# Patient Record
Sex: FEMALE | Race: OTHER | Employment: PART TIME | URBAN - NONMETROPOLITAN AREA
[De-identification: names, ages, dates, MRNs, and addresses within clinical notes are randomized per-mention and may not be internally consistent; named-entity substitution may affect disease eponyms.]

---

## 2017-09-19 ENCOUNTER — APPOINTMENT (OUTPATIENT)
Dept: ULTRASOUND IMAGING | Age: 22
End: 2017-09-19
Payer: COMMERCIAL

## 2017-09-19 ENCOUNTER — HOSPITAL ENCOUNTER (EMERGENCY)
Age: 22
Discharge: HOME | End: 2017-09-19
Attending: EMERGENCY MEDICINE
Payer: COMMERCIAL

## 2017-09-19 VITALS
HEART RATE: 85 BPM | DIASTOLIC BLOOD PRESSURE: 82 MMHG | OXYGEN SATURATION: 98 % | SYSTOLIC BLOOD PRESSURE: 139 MMHG | RESPIRATION RATE: 18 BRPM | TEMPERATURE: 97.5 F

## 2017-09-19 DIAGNOSIS — K80.20 CALCULUS OF GALLBLADDER WITHOUT CHOLECYSTITIS WITHOUT OBSTRUCTION: Primary | ICD-10-CM

## 2017-09-19 LAB
ALBUMIN SERPL-MCNC: 4.3 G/DL (ref 3.5–5.1)
ALP BLD-CCNC: 65 U/L (ref 38–126)
ALT SERPL-CCNC: 55 U/L (ref 11–66)
AMPHETAMINE+METHAMPHETAMINE URINE SCREEN: NEGATIVE
ANION GAP SERPL CALCULATED.3IONS-SCNC: 13 MEQ/L (ref 8–16)
AST SERPL-CCNC: 75 U/L (ref 5–40)
BACTERIA: ABNORMAL /HPF
BARBITURATE QUANTITATIVE URINE: NEGATIVE
BASOPHILS # BLD: 0.3 %
BASOPHILS ABSOLUTE: 0 THOU/MM3 (ref 0–0.1)
BENZODIAZEPINE QUANTITATIVE URINE: NEGATIVE
BILIRUB SERPL-MCNC: 0.5 MG/DL (ref 0.3–1.2)
BILIRUBIN DIRECT: < 0.2 MG/DL (ref 0–0.3)
BILIRUBIN URINE: NEGATIVE
BLOOD, URINE: NEGATIVE
BUN BLDV-MCNC: 10 MG/DL (ref 7–22)
CALCIUM SERPL-MCNC: 9.5 MG/DL (ref 8.5–10.5)
CANNABINOID QUANTITATIVE URINE: NEGATIVE
CASTS 2: ABNORMAL /LPF
CASTS UA: ABNORMAL /LPF
CHARACTER, URINE: ABNORMAL
CHLORIDE BLD-SCNC: 97 MEQ/L (ref 98–111)
CO2: 28 MEQ/L (ref 23–33)
COCAINE METABOLITE QUANTITATIVE URINE: NEGATIVE
COLOR: YELLOW
CREAT SERPL-MCNC: 0.8 MG/DL (ref 0.4–1.2)
CRYSTALS, UA: ABNORMAL
EKG ATRIAL RATE: 81 BPM
EKG P AXIS: 33 DEGREES
EKG P-R INTERVAL: 136 MS
EKG Q-T INTERVAL: 380 MS
EKG QRS DURATION: 84 MS
EKG QTC CALCULATION (BAZETT): 441 MS
EKG R AXIS: 44 DEGREES
EKG T AXIS: 26 DEGREES
EKG VENTRICULAR RATE: 81 BPM
EOSINOPHIL # BLD: 2.3 %
EOSINOPHILS ABSOLUTE: 0.4 THOU/MM3 (ref 0–0.4)
EPITHELIAL CELLS, UA: ABNORMAL /HPF
ETHYL ALCOHOL, SERUM: < 0.01 %
GFR SERPL CREATININE-BSD FRML MDRD: 89 ML/MIN/1.73M2
GLUCOSE BLD-MCNC: 111 MG/DL (ref 70–108)
GLUCOSE URINE: NEGATIVE MG/DL
HCT VFR BLD CALC: 40 % (ref 37–47)
HEMOGLOBIN: 13.7 GM/DL (ref 12–16)
KETONES, URINE: NEGATIVE
LEUKOCYTE ESTERASE, URINE: NEGATIVE
LIPASE: 57.3 U/L (ref 5.6–51.3)
LYMPHOCYTES # BLD: 19 %
LYMPHOCYTES ABSOLUTE: 3.1 THOU/MM3 (ref 1–4.8)
MAGNESIUM: 1.6 MG/DL (ref 1.6–2.4)
MCH RBC QN AUTO: 28.7 PG (ref 27–31)
MCHC RBC AUTO-ENTMCNC: 34.3 GM/DL (ref 33–37)
MCV RBC AUTO: 83.7 FL (ref 81–99)
MISCELLANEOUS 2: ABNORMAL
MONOCYTES # BLD: 4.7 %
MONOCYTES ABSOLUTE: 0.8 THOU/MM3 (ref 0.4–1.3)
NITRITE, URINE: NEGATIVE
NUCLEATED RED BLOOD CELLS: 0 /100 WBC
OPIATES, URINE: NEGATIVE
OSMOLALITY CALCULATION: 275.4 MOSMOL/KG (ref 275–300)
OXYCODONE: NEGATIVE
PDW BLD-RTO: 13.2 % (ref 11.5–14.5)
PH UA: 6
PHENCYCLIDINE QUANTITATIVE URINE: NEGATIVE
PLATELET # BLD: 256 THOU/MM3 (ref 130–400)
PMV BLD AUTO: 8.2 MCM (ref 7.4–10.4)
POTASSIUM SERPL-SCNC: 3.9 MEQ/L (ref 3.5–5.2)
PREGNANCY, SERUM: NEGATIVE
PROTEIN UA: NEGATIVE
RBC # BLD: 4.78 MILL/MM3 (ref 4.2–5.4)
RBC # BLD: NORMAL 10*6/UL
RBC URINE: ABNORMAL /HPF
RENAL EPITHELIAL, UA: ABNORMAL
SEG NEUTROPHILS: 73.7 %
SEGMENTED NEUTROPHILS ABSOLUTE COUNT: 11.9 THOU/MM3 (ref 1.8–7.7)
SODIUM BLD-SCNC: 138 MEQ/L (ref 135–145)
SPECIFIC GRAVITY, URINE: 1.01 (ref 1–1.03)
TOTAL PROTEIN: 7.4 G/DL (ref 6.1–8)
UROBILINOGEN, URINE: 0.2 EU/DL
WBC # BLD: 16.1 THOU/MM3 (ref 4.8–10.8)
WBC UA: ABNORMAL /HPF
YEAST: ABNORMAL

## 2017-09-19 PROCEDURE — 96375 TX/PRO/DX INJ NEW DRUG ADDON: CPT

## 2017-09-19 PROCEDURE — 6370000000 HC RX 637 (ALT 250 FOR IP): Performed by: EMERGENCY MEDICINE

## 2017-09-19 PROCEDURE — 2580000003 HC RX 258: Performed by: EMERGENCY MEDICINE

## 2017-09-19 PROCEDURE — 36415 COLL VENOUS BLD VENIPUNCTURE: CPT

## 2017-09-19 PROCEDURE — 99284 EMERGENCY DEPT VISIT MOD MDM: CPT

## 2017-09-19 PROCEDURE — 96374 THER/PROPH/DIAG INJ IV PUSH: CPT

## 2017-09-19 PROCEDURE — 83690 ASSAY OF LIPASE: CPT

## 2017-09-19 PROCEDURE — G0480 DRUG TEST DEF 1-7 CLASSES: HCPCS

## 2017-09-19 PROCEDURE — 85025 COMPLETE CBC W/AUTO DIFF WBC: CPT

## 2017-09-19 PROCEDURE — 82248 BILIRUBIN DIRECT: CPT

## 2017-09-19 PROCEDURE — 6360000002 HC RX W HCPCS: Performed by: EMERGENCY MEDICINE

## 2017-09-19 PROCEDURE — 84703 CHORIONIC GONADOTROPIN ASSAY: CPT

## 2017-09-19 PROCEDURE — C9113 INJ PANTOPRAZOLE SODIUM, VIA: HCPCS | Performed by: EMERGENCY MEDICINE

## 2017-09-19 PROCEDURE — 80053 COMPREHEN METABOLIC PANEL: CPT

## 2017-09-19 PROCEDURE — 93005 ELECTROCARDIOGRAM TRACING: CPT

## 2017-09-19 PROCEDURE — 81001 URINALYSIS AUTO W/SCOPE: CPT

## 2017-09-19 PROCEDURE — 83735 ASSAY OF MAGNESIUM: CPT

## 2017-09-19 PROCEDURE — 80307 DRUG TEST PRSMV CHEM ANLYZR: CPT

## 2017-09-19 PROCEDURE — 76705 ECHO EXAM OF ABDOMEN: CPT

## 2017-09-19 RX ORDER — 0.9 % SODIUM CHLORIDE 0.9 %
1000 INTRAVENOUS SOLUTION INTRAVENOUS ONCE
Status: COMPLETED | OUTPATIENT
Start: 2017-09-19 | End: 2017-09-19

## 2017-09-19 RX ORDER — PANTOPRAZOLE SODIUM 40 MG/10ML
40 INJECTION, POWDER, LYOPHILIZED, FOR SOLUTION INTRAVENOUS ONCE
Status: COMPLETED | OUTPATIENT
Start: 2017-09-19 | End: 2017-09-19

## 2017-09-19 RX ORDER — ONDANSETRON 2 MG/ML
4 INJECTION INTRAMUSCULAR; INTRAVENOUS ONCE
Status: COMPLETED | OUTPATIENT
Start: 2017-09-19 | End: 2017-09-19

## 2017-09-19 RX ORDER — HYDROCODONE BITARTRATE AND ACETAMINOPHEN 5; 325 MG/1; MG/1
1 TABLET ORAL EVERY 6 HOURS PRN
Qty: 15 TABLET | Refills: 0 | Status: SHIPPED | OUTPATIENT
Start: 2017-09-19 | End: 2017-09-21

## 2017-09-19 RX ORDER — ONDANSETRON 4 MG/1
4 TABLET, FILM COATED ORAL EVERY 8 HOURS PRN
Qty: 20 TABLET | Refills: 0 | Status: SHIPPED | OUTPATIENT
Start: 2017-09-19 | End: 2017-11-02

## 2017-09-19 RX ADMIN — Medication 30 ML: at 00:34

## 2017-09-19 RX ADMIN — PANTOPRAZOLE SODIUM 40 MG: 40 INJECTION, POWDER, FOR SOLUTION INTRAVENOUS at 00:34

## 2017-09-19 RX ADMIN — ONDANSETRON 4 MG: 2 INJECTION INTRAMUSCULAR; INTRAVENOUS at 00:34

## 2017-09-19 RX ADMIN — SODIUM CHLORIDE 1000 ML: 9 INJECTION, SOLUTION INTRAVENOUS at 00:34

## 2017-09-19 ASSESSMENT — ENCOUNTER SYMPTOMS
VOMITING: 1
DIARRHEA: 0
EYE DISCHARGE: 0
COUGH: 0
SORE THROAT: 0
EYE PAIN: 0
SHORTNESS OF BREATH: 0
WHEEZING: 0
ABDOMINAL PAIN: 1
BACK PAIN: 0
RHINORRHEA: 0
NAUSEA: 0

## 2017-09-19 ASSESSMENT — PAIN SCALES - GENERAL: PAINLEVEL_OUTOF10: 7

## 2017-09-19 ASSESSMENT — PAIN DESCRIPTION - PAIN TYPE: TYPE: ACUTE PAIN

## 2017-09-19 ASSESSMENT — PAIN DESCRIPTION - LOCATION: LOCATION: CHEST;ABDOMEN

## 2017-09-21 ENCOUNTER — OFFICE VISIT (OUTPATIENT)
Dept: SURGERY | Age: 22
End: 2017-09-21
Payer: COMMERCIAL

## 2017-09-21 VITALS
SYSTOLIC BLOOD PRESSURE: 122 MMHG | DIASTOLIC BLOOD PRESSURE: 76 MMHG | HEIGHT: 67 IN | RESPIRATION RATE: 18 BRPM | OXYGEN SATURATION: 97 % | HEART RATE: 114 BPM | BODY MASS INDEX: 41.59 KG/M2 | TEMPERATURE: 99.2 F | WEIGHT: 265 LBS

## 2017-09-21 DIAGNOSIS — K80.20 GALLSTONES: Primary | ICD-10-CM

## 2017-09-21 PROCEDURE — 99214 OFFICE O/P EST MOD 30 MIN: CPT | Performed by: SURGERY

## 2017-09-21 ASSESSMENT — ENCOUNTER SYMPTOMS
APNEA: 0
ANAL BLEEDING: 0
WHEEZING: 0
STRIDOR: 0
ABDOMINAL DISTENTION: 0
RECTAL PAIN: 0
NAUSEA: 1
EYE PAIN: 0
COLOR CHANGE: 0
PHOTOPHOBIA: 0
SORE THROAT: 0
EYE REDNESS: 0
FACIAL SWELLING: 0
ABDOMINAL PAIN: 1
CHEST TIGHTNESS: 0
SINUS PRESSURE: 0
CHOKING: 0
CONSTIPATION: 0
TROUBLE SWALLOWING: 0
VOICE CHANGE: 0
EYE ITCHING: 0
SHORTNESS OF BREATH: 1
BACK PAIN: 1
BLOOD IN STOOL: 0
COUGH: 0
VOMITING: 1
EYE DISCHARGE: 0
DIARRHEA: 0
RHINORRHEA: 0

## 2017-09-27 ENCOUNTER — OFFICE VISIT (OUTPATIENT)
Dept: CARDIOLOGY CLINIC | Age: 22
End: 2017-09-27
Payer: COMMERCIAL

## 2017-09-27 VITALS
WEIGHT: 259 LBS | SYSTOLIC BLOOD PRESSURE: 151 MMHG | HEIGHT: 67 IN | HEART RATE: 126 BPM | DIASTOLIC BLOOD PRESSURE: 105 MMHG | BODY MASS INDEX: 40.65 KG/M2

## 2017-09-27 DIAGNOSIS — R07.89 OTHER CHEST PAIN: Primary | ICD-10-CM

## 2017-09-27 PROCEDURE — 93000 ELECTROCARDIOGRAM COMPLETE: CPT | Performed by: INTERNAL MEDICINE

## 2017-09-27 PROCEDURE — 99204 OFFICE O/P NEW MOD 45 MIN: CPT | Performed by: INTERNAL MEDICINE

## 2017-09-27 NOTE — MR AVS SNAPSHOT
After Visit Summary             Marco Antonio Encarnacion   2017 9:45 AM   Office Visit    Description:  Female : 1995   Provider:  Zainab Hess MD   Department:  [facility] [place] [address] and Future Appointments         Below is a list of your follow-up and future appointments. This may not be a complete list as you may have made appointments directly with providers that we are not aware of or your providers may have made some for you. Please call your providers to confirm appointments. It is important to keep your appointments. Please bring your current insurance card, photo ID, co-pay, and all medication bottles to your appointment. If self-pay, payment is expected at the time of service. Your To-Do List     Follow-Up    Return if symptoms worsen or fail to improve. Information from Your Visit        Department     Name Address Phone Fax    [facility]  [place] [address] [phone]      You Were Seen for:         Comments    Other chest pain   [786.59. ICD-9-CM]         Vital Signs     Blood Pressure Pulse Height Weight Last Menstrual Period Body Mass Index    151/105 126 5' 7\" (1.702 m) 259 lb (117.5 kg) 2016 40.57 kg/m2    Smoking Status                   Never Smoker           Additional Information about your Body Mass Index (BMI)           Your BMI as listed above is considered obese (30 or more). BMI is an estimate of body fat, calculated from your height and weight. The higher your BMI, the greater your risk of heart disease, high blood pressure, type 2 diabetes, stroke, gallstones, arthritis, sleep apnea, and certain cancers. BMI is not perfect. It may overestimate body fat in athletes and people who are more muscular.   Even a small weight loss (between 5 and 10 percent of your current weight) by decreasing your calorie intake and becoming more physically active will help lower your risk of developing or worsening diseases associated with obesity. Learn more at: [employer]             Medications and Orders      Your Current Medications Are              ondansetron (ZOFRAN) 4 MG tablet Take 1 tablet by mouth every 8 hours as needed for Nausea or Vomiting    Ascorbic Acid (VITAMIN C) 500 MG tablet Take 500 mg by mouth daily      Allergies           No Known Allergies      We Ordered/Performed the following           EKG 12 Lead          Additional Information        Basic Information     Date Of Birth Sex Race Ethnicity Preferred Language    1995 Female  / English      Problem List as of 9/27/2017  Date Reviewed: 9/27/2017                Normal delivery      Preventive Care        Date Due    HIV screening is recommended for all people regardless of risk factors  aged 15-65 years at least once (lifetime) who have never been HIV tested. 4/5/2009    Tetanus Combination Vaccine (1 - Tdap) 4/5/2013    Pap Smear 4/5/2015    Yearly Flu Vaccine (1) 9/1/2017            [employer] Signup           Our records indicate that you have an active [employer] account. You can view your After Visit Summary by going to [email or web address] and logging in with your [employer] username and password. If you don't have a [employer] username and password but a parent or guardian has access to your record, the parent or guardian should login with their own [employer] username and password and access your record to view the After Visit Summary. Additional Information  If you have questions, please contact the physician practice where you receive care. Remember, [employer] is NOT to be used for urgent needs. For medical emergencies, dial 911. For questions regarding your [employer] account call [phone]. If you have a clinical question, please call your doctor's office.

## 2017-09-27 NOTE — PROGRESS NOTES
[facility] PROFESSIONAL SERVS  [facility] [place]  [address]  [address]  [place] [address]  Dept: [phone]  Dept Fax: [phone]  Loc: [phone]    Visit Date: 9/27/2017    Ms. Mabeline Koyanagi is a 20 y.o. female  who presented for:  Chief Complaint   Patient presents with    Establish Cardiologist    Cardiac Clearance       HPI:   HPI   21 yo F with hx of GERD, obesity who presents for cardiac evaluation prior to lap cholecystectomy. She was told she had an abnormal ECG. She has no cardiac symptoms. She has never had a CVA, CKD, CAD, CHF, or DM II. She has no active chest pain, sob, brooks, LE edema, palpitations, lightheadedness, dizziness, presyncope, or syncope. No active valvular issues. No A/C. No hx of DVT/PE. Able to climb a flight of stairs without issue or limitation. Cr 1.6. She was somewhat anxious regarding her ECG which was performed on 9/19 and did not demonstrate any Q-waves, but had reduced R-wave voltage in the anterior leads. Repeat ECG today demonstrated sinus tachycardia. No f/c/ns. Not actively menstruating. No unusual caffeine or drug intake. Current Outpatient Prescriptions:     ondansetron (ZOFRAN) 4 MG tablet, Take 1 tablet by mouth every 8 hours as needed for Nausea or Vomiting, Disp: 20 tablet, Rfl: 0    Ascorbic Acid (VITAMIN C) 500 MG tablet, Take 500 mg by mouth daily, Disp: , Rfl:     Past Medical History  Niranjan Fitzgerald  has a past medical history of Hypertension and Trauma. Social History  Niranjan Fitzgerald  reports that she has never smoked. She has never used smokeless tobacco. She reports that she does not drink alcohol or use illicit drugs. Family History  Suad family history is not on file. There is no family history of bicuspid aortic valve, aneurysms, heart transplant, pacemakers, defibrillators, or sudden cardiac death. Past Surgical History   History reviewed. No pertinent surgical history.     Subjective:     Review of Systems and oriented to person, place, and time. No cranial nerve deficit. Coordination normal.   Skin: Skin is warm and dry. Psychiatric: She has a normal mood and affect. Nursing note and vitals reviewed. No results found for: CKTOTAL, CKMB, CKMBINDEX    Lab Results   Component Value Date    WBC 16.1 09/19/2017    RBC 4.78 09/19/2017    HGB 13.7 09/19/2017    HCT 40.0 09/19/2017    MCV 83.7 09/19/2017    MCH 28.7 09/19/2017    MCHC 34.3 09/19/2017    RDW 13.2 09/19/2017     09/19/2017    MPV 8.2 09/19/2017       Lab Results   Component Value Date     09/19/2017    K 3.9 09/19/2017    CL 97 09/19/2017    CO2 28 09/19/2017    BUN 10 09/19/2017    LABALBU 4.3 09/19/2017    CREATININE 0.8 09/19/2017    CALCIUM 9.5 09/19/2017    LABGLOM 89 09/19/2017    GLUCOSE 111 09/19/2017       Lab Results   Component Value Date    ALKPHOS 65 09/19/2017    ALT 55 09/19/2017    AST 75 09/19/2017    PROT 7.4 09/19/2017    BILITOT 0.5 09/19/2017    BILIDIR <0.2 09/19/2017    LABALBU 4.3 09/19/2017       Lab Results   Component Value Date    MG 1.6 09/19/2017       No results found for: INR, PROTIME      No results found for: LABA1C    No results found for: TRIG, HDL, LDLCALC, LDLDIRECT, LABVLDL    No results found for: TSH      Testing Reviewed:      I have individually reviewed the below cardiac tests    EKG: As above    ECHO: No results found for this or any previous visit. STRESS: None    CATH: None    Assessment/Plan   #1 Preoperative CV exam for lap cholecystectomy  Low risk patient going for intermediate risk surgery. Can do > 4 METS. Discussed CV risk perioperatively and she agrees to move forward to with the elective surgery and accepts the < 1% risk of CV event perioperatively. She is anxious and her ECG demonstrates ST would recommend pre-op ECG as this is likely a physiologic response to her anxiety. All of the patient's/family's questions were answered to their satisfaction.   They were agreeable and amenable to the above plan. Thank you for allowing me to participate in the care of this patient. If I can be of any further assistance, please do not hesitate to contact my office ([phone]). Heriberto Garcia MD  Interventional Cardiology  [facility]      Disposition:  prn         Electronically signed by Francisco Hoffman MD   9/27/2017 at 10:16 AM

## 2017-09-28 ENCOUNTER — TELEPHONE (OUTPATIENT)
Dept: SURGERY | Age: 22
End: 2017-09-28

## 2017-10-01 ASSESSMENT — ENCOUNTER SYMPTOMS
ABDOMINAL DISTENTION: 0
NAUSEA: 0
COUGH: 0
EYE ITCHING: 0
SINUS PRESSURE: 0
CHEST TIGHTNESS: 0
CONSTIPATION: 0
DIARRHEA: 0
APNEA: 0
SHORTNESS OF BREATH: 0
SORE THROAT: 0
ABDOMINAL PAIN: 0
EYE PAIN: 0
BACK PAIN: 0
EYE REDNESS: 0
EYE DISCHARGE: 0
BLOOD IN STOOL: 0

## 2017-10-02 ENCOUNTER — TELEPHONE (OUTPATIENT)
Dept: SURGERY | Age: 22
End: 2017-10-02

## 2017-10-02 NOTE — TELEPHONE ENCOUNTER
Nando Alvarez called me back to schedule her lap dory & left a message last Friday 9/29 but I am off on Fridays so I just received her message today. I tried to call her back but no answer & again no message could be left.

## 2017-10-03 ENCOUNTER — TELEPHONE (OUTPATIENT)
Dept: SURGERY | Age: 22
End: 2017-10-03

## 2017-10-03 NOTE — TELEPHONE ENCOUNTER
Finally reached Enid Berger & she will speak to her  & call me back tomorrow morning regarding her surgery date.

## 2017-10-04 ENCOUNTER — TELEPHONE (OUTPATIENT)
Dept: SURGERY | Age: 22
End: 2017-10-04

## 2017-10-04 NOTE — TELEPHONE ENCOUNTER
Desiree Galan called & wanted her lap dory scheduled on Fri 10/13 at 11am & she will arrive at 9:30. Desiree Galan was told to be npo after midnight & consent was signed.

## 2017-10-06 NOTE — PROGRESS NOTES
In preparation for their surgical procedure above patient was screened for Obstructive Sleep Apnea (JANUARY) using the STOP-Bang Questionnaire by the Pre-Admission Testing department. This is a pre-surgical screening tool for patient safety and serves as a recommendation, this WILL NOT cause cancellation of surgery. STOP-Bang Questionnaire  * Do you currently see a pulmonologist?  No     If yes STOP, do not complete. Patient follows with     1. Do you snore loudly (able to be heard in the next room)? No    2. Do you often feel tired or sleepy during the daytime? No       3. Has anyone ever told you that you stop breathing during your sleep? No    4. Do you have or are you being treated for high blood pressure? No      5. BMI more than 35? BMI (Calculated): 31.4        No    6. Age over 48 years? 20 y.o. No    7. Neck Circumference greater than 17 inches for male or 16 inches for female? Measured           (visits only)            Not Applicable    8. Gender Male? No      TOTAL SCORE: 0    JANUARY - Low Risk : Yes to 0 - 2 questions  JANUARY - Intermediate Risk : Yes to 3 - 4 questions  JANUARY - High Risk : Yes to 5 - 8 questions    Adapted from:   STOP Questionnaire: A Tool to Screen Patients for Obstructive Sleep Apnea   INDER LoomisC[employer]P.C., Carina Chu M.B.B.S., Aman Miller M.D., Josette Manley. Willaim , Ph.D., LESLIE Gavin.B.B.S., Yahaira Arias, M.Sc., Dallas Campuzano M.D., Christiane Daily. CLIFF CamachoP.C.    Anesthesiology 2008; 423:989-33 Copyright 2008, the [address] of Anesthesiologists, [facility] 37.   ----------------------------------------------------------------------------------------------------------------

## 2017-10-06 NOTE — PROGRESS NOTES
NPO after midnight  [employer] and drivers license  Wear comfortable clean clothing  Do not bring jewelry   Shower night before and morning of surgery with a liquid antibacterial soap  Bring medications in original bottles  Follow all instructions given by your physician   needed at discharge

## 2017-10-13 ENCOUNTER — ANESTHESIA (OUTPATIENT)
Dept: OPERATING ROOM | Age: 22
End: 2017-10-13
Payer: COMMERCIAL

## 2017-10-13 ENCOUNTER — ANESTHESIA EVENT (OUTPATIENT)
Dept: OPERATING ROOM | Age: 22
End: 2017-10-13
Payer: COMMERCIAL

## 2017-10-13 ENCOUNTER — HOSPITAL ENCOUNTER (OUTPATIENT)
Age: 22
Setting detail: OUTPATIENT SURGERY
Discharge: HOME | End: 2017-10-13
Attending: SURGERY | Admitting: SURGERY
Payer: COMMERCIAL

## 2017-10-13 ENCOUNTER — APPOINTMENT (OUTPATIENT)
Dept: GENERAL RADIOLOGY | Age: 22
End: 2017-10-13
Attending: SURGERY
Payer: COMMERCIAL

## 2017-10-13 VITALS
TEMPERATURE: 97.7 F | OXYGEN SATURATION: 98 % | HEIGHT: 67 IN | SYSTOLIC BLOOD PRESSURE: 129 MMHG | RESPIRATION RATE: 16 BRPM | HEART RATE: 107 BPM | WEIGHT: 260.6 LBS | BODY MASS INDEX: 40.9 KG/M2 | DIASTOLIC BLOOD PRESSURE: 72 MMHG

## 2017-10-13 VITALS — DIASTOLIC BLOOD PRESSURE: 62 MMHG | SYSTOLIC BLOOD PRESSURE: 125 MMHG | OXYGEN SATURATION: 92 %

## 2017-10-13 LAB — PREGNANCY, URINE: NEGATIVE

## 2017-10-13 PROCEDURE — 6360000002 HC RX W HCPCS: Performed by: ANESTHESIOLOGY

## 2017-10-13 PROCEDURE — 2500000003 HC RX 250 WO HCPCS: Performed by: NURSE ANESTHETIST, CERTIFIED REGISTERED

## 2017-10-13 PROCEDURE — 81025 URINE PREGNANCY TEST: CPT

## 2017-10-13 PROCEDURE — 88304 TISSUE EXAM BY PATHOLOGIST: CPT

## 2017-10-13 PROCEDURE — 2580000003 HC RX 258

## 2017-10-13 PROCEDURE — 7100000010 HC PHASE II RECOVERY - FIRST 15 MIN: Performed by: SURGERY

## 2017-10-13 PROCEDURE — 7100000011 HC PHASE II RECOVERY - ADDTL 15 MIN: Performed by: SURGERY

## 2017-10-13 PROCEDURE — 47563 LAPARO CHOLECYSTECTOMY/GRAPH: CPT | Performed by: SURGERY

## 2017-10-13 PROCEDURE — 7100000000 HC PACU RECOVERY - FIRST 15 MIN: Performed by: SURGERY

## 2017-10-13 PROCEDURE — 6360000002 HC RX W HCPCS: Performed by: NURSE ANESTHETIST, CERTIFIED REGISTERED

## 2017-10-13 PROCEDURE — 3700000000 HC ANESTHESIA ATTENDED CARE: Performed by: SURGERY

## 2017-10-13 PROCEDURE — C1894 INTRO/SHEATH, NON-LASER: HCPCS | Performed by: SURGERY

## 2017-10-13 PROCEDURE — 6370000000 HC RX 637 (ALT 250 FOR IP): Performed by: SURGERY

## 2017-10-13 PROCEDURE — 3700000001 HC ADD 15 MINUTES (ANESTHESIA): Performed by: SURGERY

## 2017-10-13 PROCEDURE — 2500000003 HC RX 250 WO HCPCS: Performed by: SURGERY

## 2017-10-13 PROCEDURE — 3600000013 HC SURGERY LEVEL 3 ADDTL 15MIN: Performed by: SURGERY

## 2017-10-13 PROCEDURE — 6360000002 HC RX W HCPCS: Performed by: SURGERY

## 2017-10-13 PROCEDURE — C1729 CATH, DRAINAGE: HCPCS | Performed by: SURGERY

## 2017-10-13 PROCEDURE — 7100000001 HC PACU RECOVERY - ADDTL 15 MIN: Performed by: SURGERY

## 2017-10-13 PROCEDURE — 93005 ELECTROCARDIOGRAM TRACING: CPT

## 2017-10-13 PROCEDURE — 3600000003 HC SURGERY LEVEL 3 BASE: Performed by: SURGERY

## 2017-10-13 PROCEDURE — 74300 X-RAY BILE DUCTS/PANCREAS: CPT

## 2017-10-13 RX ORDER — FENTANYL CITRATE 50 UG/ML
50 INJECTION, SOLUTION INTRAMUSCULAR; INTRAVENOUS EVERY 5 MIN PRN
Status: DISCONTINUED | OUTPATIENT
Start: 2017-10-13 | End: 2017-10-13 | Stop reason: HOSPADM

## 2017-10-13 RX ORDER — DEXAMETHASONE SODIUM PHOSPHATE 4 MG/ML
INJECTION, SOLUTION INTRA-ARTICULAR; INTRALESIONAL; INTRAMUSCULAR; INTRAVENOUS; SOFT TISSUE PRN
Status: DISCONTINUED | OUTPATIENT
Start: 2017-10-13 | End: 2017-10-13 | Stop reason: SDUPTHER

## 2017-10-13 RX ORDER — MIDAZOLAM HYDROCHLORIDE 1 MG/ML
INJECTION INTRAMUSCULAR; INTRAVENOUS PRN
Status: DISCONTINUED | OUTPATIENT
Start: 2017-10-13 | End: 2017-10-13 | Stop reason: SDUPTHER

## 2017-10-13 RX ORDER — PROMETHAZINE HYDROCHLORIDE 25 MG/ML
INJECTION, SOLUTION INTRAMUSCULAR; INTRAVENOUS
Status: DISCONTINUED
Start: 2017-10-13 | End: 2017-10-13 | Stop reason: HOSPADM

## 2017-10-13 RX ORDER — FENTANYL CITRATE 50 UG/ML
INJECTION, SOLUTION INTRAMUSCULAR; INTRAVENOUS PRN
Status: DISCONTINUED | OUTPATIENT
Start: 2017-10-13 | End: 2017-10-13 | Stop reason: SDUPTHER

## 2017-10-13 RX ORDER — ROCURONIUM BROMIDE 10 MG/ML
INJECTION, SOLUTION INTRAVENOUS PRN
Status: DISCONTINUED | OUTPATIENT
Start: 2017-10-13 | End: 2017-10-13 | Stop reason: SDUPTHER

## 2017-10-13 RX ORDER — MEPERIDINE HYDROCHLORIDE 25 MG/ML
12.5 INJECTION INTRAMUSCULAR; INTRAVENOUS; SUBCUTANEOUS EVERY 5 MIN PRN
Status: DISCONTINUED | OUTPATIENT
Start: 2017-10-13 | End: 2017-10-13 | Stop reason: HOSPADM

## 2017-10-13 RX ORDER — BUPIVACAINE HYDROCHLORIDE AND EPINEPHRINE 5; 5 MG/ML; UG/ML
INJECTION, SOLUTION EPIDURAL; INTRACAUDAL; PERINEURAL PRN
Status: DISCONTINUED | OUTPATIENT
Start: 2017-10-13 | End: 2017-10-13 | Stop reason: HOSPADM

## 2017-10-13 RX ORDER — LIDOCAINE HYDROCHLORIDE 20 MG/ML
INJECTION, SOLUTION EPIDURAL; INFILTRATION; INTRACAUDAL; PERINEURAL PRN
Status: DISCONTINUED | OUTPATIENT
Start: 2017-10-13 | End: 2017-10-13 | Stop reason: SDUPTHER

## 2017-10-13 RX ORDER — PROPOFOL 10 MG/ML
INJECTION, EMULSION INTRAVENOUS PRN
Status: DISCONTINUED | OUTPATIENT
Start: 2017-10-13 | End: 2017-10-13 | Stop reason: SDUPTHER

## 2017-10-13 RX ORDER — SODIUM CHLORIDE 9 MG/ML
INJECTION, SOLUTION INTRAVENOUS CONTINUOUS
Status: DISCONTINUED | OUTPATIENT
Start: 2017-10-13 | End: 2017-10-13 | Stop reason: HOSPADM

## 2017-10-13 RX ORDER — OXYCODONE HYDROCHLORIDE AND ACETAMINOPHEN 5; 325 MG/1; MG/1
1 TABLET ORAL EVERY 6 HOURS PRN
Status: DISCONTINUED | OUTPATIENT
Start: 2017-10-13 | End: 2017-10-13 | Stop reason: HOSPADM

## 2017-10-13 RX ORDER — KETOROLAC TROMETHAMINE 30 MG/ML
INJECTION, SOLUTION INTRAMUSCULAR; INTRAVENOUS PRN
Status: DISCONTINUED | OUTPATIENT
Start: 2017-10-13 | End: 2017-10-13 | Stop reason: SDUPTHER

## 2017-10-13 RX ORDER — FENTANYL CITRATE 50 UG/ML
25 INJECTION, SOLUTION INTRAMUSCULAR; INTRAVENOUS EVERY 5 MIN PRN
Status: DISCONTINUED | OUTPATIENT
Start: 2017-10-13 | End: 2017-10-13 | Stop reason: HOSPADM

## 2017-10-13 RX ORDER — OXYCODONE HYDROCHLORIDE AND ACETAMINOPHEN 5; 325 MG/1; MG/1
1 TABLET ORAL EVERY 6 HOURS PRN
Qty: 20 TABLET | Refills: 0 | Status: SHIPPED | OUTPATIENT
Start: 2017-10-13 | End: 2017-11-02

## 2017-10-13 RX ORDER — METOCLOPRAMIDE HYDROCHLORIDE 5 MG/ML
10 INJECTION INTRAMUSCULAR; INTRAVENOUS
Status: COMPLETED | OUTPATIENT
Start: 2017-10-13 | End: 2017-10-13

## 2017-10-13 RX ORDER — DIPHENHYDRAMINE HYDROCHLORIDE 50 MG/ML
12.5 INJECTION INTRAMUSCULAR; INTRAVENOUS
Status: DISCONTINUED | OUTPATIENT
Start: 2017-10-13 | End: 2017-10-13 | Stop reason: HOSPADM

## 2017-10-13 RX ORDER — ONDANSETRON 2 MG/ML
INJECTION INTRAMUSCULAR; INTRAVENOUS
Status: DISCONTINUED
Start: 2017-10-13 | End: 2017-10-13 | Stop reason: HOSPADM

## 2017-10-13 RX ORDER — NEOSTIGMINE METHYLSULFATE 1 MG/ML
INJECTION, SOLUTION INTRAVENOUS PRN
Status: DISCONTINUED | OUTPATIENT
Start: 2017-10-13 | End: 2017-10-13 | Stop reason: SDUPTHER

## 2017-10-13 RX ORDER — GLYCOPYRROLATE 0.2 MG/ML
INJECTION INTRAMUSCULAR; INTRAVENOUS PRN
Status: DISCONTINUED | OUTPATIENT
Start: 2017-10-13 | End: 2017-10-13 | Stop reason: SDUPTHER

## 2017-10-13 RX ORDER — PROMETHAZINE HYDROCHLORIDE 25 MG/ML
12.5 INJECTION, SOLUTION INTRAMUSCULAR; INTRAVENOUS
Status: DISCONTINUED | OUTPATIENT
Start: 2017-10-13 | End: 2017-10-13 | Stop reason: HOSPADM

## 2017-10-13 RX ORDER — LABETALOL HYDROCHLORIDE 5 MG/ML
5 INJECTION, SOLUTION INTRAVENOUS EVERY 10 MIN PRN
Status: DISCONTINUED | OUTPATIENT
Start: 2017-10-13 | End: 2017-10-13 | Stop reason: HOSPADM

## 2017-10-13 RX ORDER — PROMETHAZINE HYDROCHLORIDE 25 MG/ML
12.5 INJECTION, SOLUTION INTRAMUSCULAR; INTRAVENOUS EVERY 6 HOURS PRN
Status: DISCONTINUED | OUTPATIENT
Start: 2017-10-13 | End: 2017-10-13 | Stop reason: HOSPADM

## 2017-10-13 RX ORDER — OXYCODONE HYDROCHLORIDE AND ACETAMINOPHEN 5; 325 MG/1; MG/1
TABLET ORAL
Status: DISCONTINUED
Start: 2017-10-13 | End: 2017-10-13 | Stop reason: HOSPADM

## 2017-10-13 RX ORDER — ONDANSETRON 2 MG/ML
INJECTION INTRAMUSCULAR; INTRAVENOUS PRN
Status: DISCONTINUED | OUTPATIENT
Start: 2017-10-13 | End: 2017-10-13 | Stop reason: SDUPTHER

## 2017-10-13 RX ORDER — ONDANSETRON 2 MG/ML
4 INJECTION INTRAMUSCULAR; INTRAVENOUS EVERY 6 HOURS PRN
Status: DISCONTINUED | OUTPATIENT
Start: 2017-10-13 | End: 2017-10-13 | Stop reason: HOSPADM

## 2017-10-13 RX ADMIN — KETOROLAC TROMETHAMINE 30 MG: 30 INJECTION, SOLUTION INTRAMUSCULAR at 12:12

## 2017-10-13 RX ADMIN — METOCLOPRAMIDE 10 MG: 5 INJECTION, SOLUTION INTRAMUSCULAR; INTRAVENOUS at 12:52

## 2017-10-13 RX ADMIN — ONDANSETRON 4 MG: 2 INJECTION INTRAMUSCULAR; INTRAVENOUS at 13:34

## 2017-10-13 RX ADMIN — CEFAZOLIN SODIUM 2 G: 2 SOLUTION INTRAVENOUS at 11:39

## 2017-10-13 RX ADMIN — PROPOFOL 200 MG: 10 INJECTION, EMULSION INTRAVENOUS at 11:34

## 2017-10-13 RX ADMIN — FENTANYL CITRATE 25 MCG: 50 INJECTION, SOLUTION INTRAMUSCULAR; INTRAVENOUS at 12:18

## 2017-10-13 RX ADMIN — LIDOCAINE HYDROCHLORIDE 100 MG: 20 INJECTION, SOLUTION EPIDURAL; INFILTRATION; INTRACAUDAL; PERINEURAL at 11:34

## 2017-10-13 RX ADMIN — NEOSTIGMINE METHYLSULFATE 3 MG: 1 INJECTION, SOLUTION INTRAVENOUS at 12:14

## 2017-10-13 RX ADMIN — ONDANSETRON 4 MG: 2 INJECTION INTRAMUSCULAR; INTRAVENOUS at 12:08

## 2017-10-13 RX ADMIN — FENTANYL CITRATE 50 MCG: 50 INJECTION INTRAMUSCULAR; INTRAVENOUS at 12:50

## 2017-10-13 RX ADMIN — SODIUM CHLORIDE: 9 INJECTION, SOLUTION INTRAVENOUS at 10:10

## 2017-10-13 RX ADMIN — FENTANYL CITRATE 50 MCG: 50 INJECTION, SOLUTION INTRAMUSCULAR; INTRAVENOUS at 11:40

## 2017-10-13 RX ADMIN — GLYCOPYRROLATE 0.4 MG: 0.2 INJECTION, SOLUTION INTRAMUSCULAR; INTRAVENOUS at 12:14

## 2017-10-13 RX ADMIN — PROMETHAZINE HYDROCHLORIDE 12.5 MG: 25 INJECTION INTRAMUSCULAR; INTRAVENOUS at 15:21

## 2017-10-13 RX ADMIN — FENTANYL CITRATE 50 MCG: 50 INJECTION, SOLUTION INTRAMUSCULAR; INTRAVENOUS at 11:30

## 2017-10-13 RX ADMIN — FENTANYL CITRATE 50 MCG: 50 INJECTION INTRAMUSCULAR; INTRAVENOUS at 12:37

## 2017-10-13 RX ADMIN — ROCURONIUM BROMIDE 50 MG: 10 INJECTION INTRAVENOUS at 11:34

## 2017-10-13 RX ADMIN — FENTANYL CITRATE 25 MCG: 50 INJECTION, SOLUTION INTRAMUSCULAR; INTRAVENOUS at 12:21

## 2017-10-13 RX ADMIN — OXYCODONE HYDROCHLORIDE AND ACETAMINOPHEN 1 TABLET: 5; 325 TABLET ORAL at 14:28

## 2017-10-13 RX ADMIN — FENTANYL CITRATE 100 MCG: 50 INJECTION, SOLUTION INTRAMUSCULAR; INTRAVENOUS at 12:01

## 2017-10-13 RX ADMIN — DEXAMETHASONE SODIUM PHOSPHATE 8 MG: 4 INJECTION, SOLUTION INTRAMUSCULAR; INTRAVENOUS at 12:08

## 2017-10-13 RX ADMIN — SODIUM CHLORIDE: 9 INJECTION, SOLUTION INTRAVENOUS at 11:33

## 2017-10-13 RX ADMIN — MIDAZOLAM 2 MG: 1 INJECTION INTRAMUSCULAR; INTRAVENOUS at 11:33

## 2017-10-13 ASSESSMENT — PULMONARY FUNCTION TESTS
PIF_VALUE: 4
PIF_VALUE: 18
PIF_VALUE: 25
PIF_VALUE: 16
PIF_VALUE: 19
PIF_VALUE: 25
PIF_VALUE: 16
PIF_VALUE: 25
PIF_VALUE: 11
PIF_VALUE: 11
PIF_VALUE: 25
PIF_VALUE: 16
PIF_VALUE: 11
PIF_VALUE: 21
PIF_VALUE: 20
PIF_VALUE: 21
PIF_VALUE: 12
PIF_VALUE: 17
PIF_VALUE: 16
PIF_VALUE: 17
PIF_VALUE: 16
PIF_VALUE: 25
PIF_VALUE: 16
PIF_VALUE: 25
PIF_VALUE: 25
PIF_VALUE: 24
PIF_VALUE: 11
PIF_VALUE: 11
PIF_VALUE: 25
PIF_VALUE: 7
PIF_VALUE: 21
PIF_VALUE: 7
PIF_VALUE: 19
PIF_VALUE: 15
PIF_VALUE: 25
PIF_VALUE: 12
PIF_VALUE: 2
PIF_VALUE: 11
PIF_VALUE: 18
PIF_VALUE: 25
PIF_VALUE: 22
PIF_VALUE: 22
PIF_VALUE: 11
PIF_VALUE: 11
PIF_VALUE: 25
PIF_VALUE: 16
PIF_VALUE: 24
PIF_VALUE: 21
PIF_VALUE: 11
PIF_VALUE: 21
PIF_VALUE: 21

## 2017-10-13 ASSESSMENT — PAIN SCALES - GENERAL
PAINLEVEL_OUTOF10: 1
PAINLEVEL_OUTOF10: 8
PAINLEVEL_OUTOF10: 8
PAINLEVEL_OUTOF10: 7
PAINLEVEL_OUTOF10: 5
PAINLEVEL_OUTOF10: 7
PAINLEVEL_OUTOF10: 7
PAINLEVEL_OUTOF10: 6

## 2017-10-13 ASSESSMENT — PAIN DESCRIPTION - PAIN TYPE: TYPE: ACUTE PAIN

## 2017-10-13 ASSESSMENT — PAIN DESCRIPTION - LOCATION: LOCATION: ABDOMEN

## 2017-10-13 NOTE — ANESTHESIA PRE PROCEDURE
SpO2:  97%    Weight: 200 lb (90.7 kg)  260 lb 9.6 oz (118.2 kg)   Height: 5' 7\" (1.702 m)  5' 7\" (1.702 m)                                              BP Readings from Last 3 Encounters:   10/13/17 (!) 159/75   10/13/17 (!) 106/45   09/27/17 (!) 151/105       NPO Status: Time of last liquid consumption: 2000                        Time of last solid consumption: 2000                        Date of last liquid consumption: 10/12/17                        Date of last solid food consumption: 10/12/17    BMI:   Wt Readings from Last 3 Encounters:   10/13/17 260 lb 9.6 oz (118.2 kg)   09/27/17 259 lb (117.5 kg)   09/21/17 265 lb (120.2 kg)     Body mass index is 40.82 kg/m². CBC:   Lab Results   Component Value Date    WBC 16.1 09/19/2017    RBC 4.78 09/19/2017    HGB 13.7 09/19/2017    HCT 40.0 09/19/2017    MCV 83.7 09/19/2017    RDW 13.2 09/19/2017     09/19/2017       CMP:   Lab Results   Component Value Date     09/19/2017    K 3.9 09/19/2017    CL 97 09/19/2017    CO2 28 09/19/2017    BUN 10 09/19/2017    CREATININE 0.8 09/19/2017    LABGLOM 89 09/19/2017    GLUCOSE 111 09/19/2017    PROT 7.4 09/19/2017    CALCIUM 9.5 09/19/2017    BILITOT 0.5 09/19/2017    ALKPHOS 65 09/19/2017    AST 75 09/19/2017    ALT 55 09/19/2017       POC Tests: No results for input(s): POCGLU, POCNA, POCK, POCCL, POCBUN, POCHEMO, POCHCT in the last 72 hours.     Coags: No results found for: PROTIME, INR, APTT    HCG (If Applicable):   Lab Results   Component Value Date    PREGTESTUR NEGATIVE 10/13/2017    PREGSERUM NEGATIVE 09/19/2017        ABGs: No results found for: PHART, PO2ART, THC4OUV, TRK2AIR, BEART, G5UNHPML     Type & Screen (If Applicable):  Lab Results   Component Value Date    [facility] POS 02/17/2017       Anesthesia Evaluation  Patient summary reviewed and Nursing notes reviewed  Airway: Mallampati: II  TM distance: >3 FB   Neck ROM: full  Mouth opening: > = 3 FB Dental: normal exam         Pulmonary:negative ROS and normal exam  breath sounds clear to auscultation                             Cardiovascular:  Exercise tolerance: good (>4 METS),   (+) hypertension:,       ECG reviewed  Rhythm: regular  Rate: normal           Beta Blocker:  Not on Beta Blocker         Neuro/Psych:   {neg ROS              GI/Hepatic/Renal: neg ROS            Endo/Other: negative ROS                    Abdominal:   (+) obese,         Vascular:                                      Anesthesia Plan      general     ASA 3       Induction: intravenous. MIPS: Prophylactic antiemetics administered. Anesthetic plan and risks discussed with patient. Plan discussed with CRNA.                   Delfina Fry MD   10/13/2017

## 2017-10-13 NOTE — PROGRESS NOTES
Patient is having some chest discomfort the same that she was having during recovery. Offered to call Dr. Venu Fontenot for additional orders and possible admission. Patient refuses and wants to go home. Patient states that she will return to the ER if necessary. Encouraged ambulating and deep breathing and coughing.

## 2017-10-13 NOTE — PROGRESS NOTES
Moderate amount of serosanguinous fluid, light pink on her abd dressing at the umbilicus. Old dressing removed and new ABD applied and secured with paper tape.

## 2017-10-13 NOTE — PROGRESS NOTES
Ambulated to the bathroom with a steady gait. Voided without difficulty. Ambulated back to bed. IV removed.

## 2017-10-13 NOTE — H&P
Subjective:      Patient ID: Liberty Giraldo is a 20 y.o. female. Chief Complaint   Patient presents with    Surgical Consult     est ki-kuaqazcbmf-bdk pregant last visit-now had baby-ready to schedule surgery       HPI 44-year-old white female who was actually seen last October with what appeared to be biliary colic symptoms with an ultrasound showing cholelithiasis. At the time she was pregnant but delivered a healthy baby in February of this year. 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 HENT: Negative for congestion, dental problem, drooling, ear discharge, ear pain, facial swelling, hearing loss, mouth sores, nosebleeds, postnasal drip, rhinorrhea, sinus pressure, sneezing, sore throat, tinnitus, trouble swallowing and voice change. Eyes: Negative for photophobia, pain, discharge, redness, itching and visual disturbance. Respiratory: Positive for shortness of breath. Negative for apnea, cough, choking, chest tightness, wheezing and stridor. Cardiovascular: Negative for chest pain, palpitations and leg swelling. Gastrointestinal: Positive for abdominal pain, nausea and vomiting. Negative for abdominal distention, anal bleeding, blood in stool, constipation, diarrhea and rectal pain.    Endocrine: Negative for family history. No Known Allergies    Current Outpatient Prescriptions:     ondansetron (ZOFRAN) 4 MG tablet, Take 1 tablet by mouth every 8 hours as needed for Nausea or Vomiting, Disp: 20 tablet, Rfl: 0    Ascorbic Acid (VITAMIN C) 500 MG tablet, Take 500 mg by mouth daily, Disp: , Rfl:     Objective:   Physical Exam   Constitutional: She is oriented to person, place, and time. She appears well-developed and well-nourished. HENT:   Head: Normocephalic and atraumatic. Eyes: Conjunctivae and EOM are normal. Pupils are equal, round, and reactive to light. Right eye exhibits no discharge. Left eye exhibits no discharge. No scleral icterus. Neck: Normal range of motion. Neck supple. No JVD present. No thyromegaly present. Cardiovascular: Normal rate and regular rhythm. Exam reveals no gallop and no friction rub. No murmur heard. Pulmonary/Chest: Effort normal and breath sounds normal. No stridor. No respiratory distress. She has no wheezes. She exhibits no tenderness. Abdominal: She exhibits no distension. There is no tenderness. There is no rebound and no guarding. Musculoskeletal: Normal range of motion. Neurological: She is alert and oriented to person, place, and time. Skin: Skin is warm and dry. No rash noted. No erythema. No pallor. Psychiatric: She has a normal mood and affect. Her behavior is normal. Judgment and thought content normal.       Assessment:      Symptomatic biliary colic with gallstones present for at least a year. Discussed gallbladder disease again with the patient in the potential problems of acute cholecystitis pancreatitis, and bile duct stones.   We then discussed the laparoscopic procedure for removing the gallbladder and the potential need for open she voices understanding and would like to proceed we did discuss the potential for common duct injury and bleeding with the laparoscopic procedure      Plan:      1. Schedule Suad for laparoscopic cholecystectomy possible open  2. The risks, benefits and alternatives were discussed with Yenikatina Anders. She understands and wishes to proceed with surgical intervention. 3. Restrictions discussed with Drew Anders and she expresses understanding. 4. She is advised to call back directly if there are further questions/concerns, or if her symptoms worsen prior to surgery.

## 2017-10-13 NOTE — ANESTHESIA POSTPROCEDURE EVALUATION
Stable    Post-op Pain:  Adequate analgesia    Post-op Assessment:  No apparent anesthetic complications    Additional Follow-Up / Treatment / Comment:  None    Nallely Ortiz MD  October 13, 2017   4:58 PM

## 2017-10-13 NOTE — PROGRESS NOTES
Returned to [facility] from PACU. Mother at bedside. Complaining of nausea. Drinking some water and eating a few crackers.

## 2017-10-16 LAB
EKG ATRIAL RATE: 86 BPM
EKG P AXIS: 61 DEGREES
EKG P-R INTERVAL: 142 MS
EKG Q-T INTERVAL: 366 MS
EKG QRS DURATION: 78 MS
EKG QTC CALCULATION (BAZETT): 437 MS
EKG R AXIS: 56 DEGREES
EKG T AXIS: 45 DEGREES
EKG VENTRICULAR RATE: 86 BPM

## 2017-10-24 ENCOUNTER — TELEPHONE (OUTPATIENT)
Dept: FAMILY MEDICINE CLINIC | Age: 22
End: 2017-10-24

## 2017-10-24 NOTE — TELEPHONE ENCOUNTER
Previsit planning call, pt had a generic ans jayne, left msg stating this is Cyn Clintont at 's office, please call us at [phone], at your earliest convenience. 1. Appt time and date. (give directions)     10/30/17   3:00   Dr Rafi Rosen    2. Arrive 15 min before appt. 3. Please bring all medications to appt. 4. Bring immunization record.   (if no record, which immunizations have you had and where?)

## 2017-10-26 ENCOUNTER — OFFICE VISIT (OUTPATIENT)
Dept: SURGERY | Age: 22
End: 2017-10-26

## 2017-10-26 VITALS
OXYGEN SATURATION: 99 % | WEIGHT: 257 LBS | BODY MASS INDEX: 40.34 KG/M2 | RESPIRATION RATE: 16 BRPM | SYSTOLIC BLOOD PRESSURE: 122 MMHG | DIASTOLIC BLOOD PRESSURE: 80 MMHG | HEART RATE: 94 BPM | HEIGHT: 67 IN | TEMPERATURE: 98 F

## 2017-10-26 DIAGNOSIS — Z90.49 S/P LAPAROSCOPIC CHOLECYSTECTOMY: Primary | ICD-10-CM

## 2017-10-26 PROCEDURE — 99024 POSTOP FOLLOW-UP VISIT: CPT | Performed by: NURSE PRACTITIONER

## 2017-10-26 RX ORDER — DIAZEPAM 5 MG/1
5 TABLET ORAL NIGHTLY PRN
Qty: 7 TABLET | Refills: 0 | Status: SHIPPED | OUTPATIENT
Start: 2017-10-26 | End: 2017-11-02

## 2017-10-26 ASSESSMENT — ENCOUNTER SYMPTOMS
WHEEZING: 0
RECTAL PAIN: 0
DIARRHEA: 0
VOMITING: 0
COUGH: 0
NAUSEA: 0
EYE ITCHING: 0
ANAL BLEEDING: 0
PHOTOPHOBIA: 0
CHOKING: 0
SHORTNESS OF BREATH: 0
STRIDOR: 0
APNEA: 0
FACIAL SWELLING: 0
EYE PAIN: 0
BLOOD IN STOOL: 0
CHEST TIGHTNESS: 0
BACK PAIN: 0
EYE REDNESS: 0
ABDOMINAL DISTENTION: 0
VOICE CHANGE: 0
SINUS PRESSURE: 0
RHINORRHEA: 0
TROUBLE SWALLOWING: 0
COLOR CHANGE: 0
EYE DISCHARGE: 0
SORE THROAT: 0
CONSTIPATION: 0
ABDOMINAL PAIN: 1

## 2017-10-26 NOTE — PROGRESS NOTES
[facility] PROFESSIONAL SERVS  [facility]  [address] [address] [address]  [address] [address]  Dept: [phone]  Dept Fax: [phone]  Loc: [phone]    Visit Date: 10/26/2017       Catalina Reyes is a 20 y.o. female who presents today for:  Chief Complaint   Patient presents with    Post-Op Check     2 wk fu- S/p lap dory 10/13/17       HPI:     Deon Lyon presents today for a 2 week post op check. Today She complains of mild abdominal pain, however is is no longer taking pain medication. Patient feels like she has had a few gallbladder attacks. She denies significant abdominal pain, more like spasms. She is tolerating meals, no nausea and vomiting, no fevers. Abdomen is non tender. The pain occurs mostly at night when she relaxes. Prescribed valium at bedtime if the incident occurs. Encouraged ibuprofen for discomfort. She has an infant son and has not rested and recovered like she should. She is okay to return to work on Saturday. If symptoms persist call back in 2 weeks for an appt. No concerns, pathology has been reviewed. Past Medical History:   Diagnosis Date    Hypertension     blood pressure increased yesterday    Trauma     fall 12/23/16      Past Surgical History:   Procedure Laterality Date    CHOLECYSTECTOMY, LAPAROSCOPIC N/A 10/13/2017    LAP CHOLECYSTECTOMY WITH CHOLANGIOGRAM performed by Pb Corrales MD at [address] [place] [address] History   Problem Relation Age of Onset    No Known Problems Mother     No Known Problems Father      Social History   Substance Use Topics    Smoking status: Never Smoker    Smokeless tobacco: Never Used    Alcohol use No        Current Outpatient Prescriptions   Medication Sig Dispense Refill    Ascorbic Acid (VITAMIN C) 500 MG tablet Take 500 mg by mouth daily      oxyCODONE-acetaminophen (PERCOCET) 5-325 MG per tablet Take 1 tablet by mouth every 6 hours as needed for Pain .  20 tablet 0    ondansetron (ZOFRAN) 4 MG tablet Take 1 tablet by mouth every 8 hours as needed for Nausea or Vomiting 20 tablet 0     No current facility-administered medications for this visit. No Known Allergies    Subjective:      Review of Systems   Constitutional: Negative for activity change, appetite change, chills, diaphoresis, fatigue, fever and unexpected weight change. HENT: Negative for congestion, dental problem, drooling, ear discharge, ear pain, facial swelling, hearing loss, mouth sores, nosebleeds, postnasal drip, rhinorrhea, sinus pressure, sneezing, sore throat, tinnitus, trouble swallowing and voice change. Eyes: Negative for photophobia, pain, discharge, redness, itching and visual disturbance. Respiratory: Negative for apnea, cough, choking, chest tightness, shortness of breath, wheezing and stridor. Cardiovascular: Negative for chest pain, palpitations and leg swelling. Gastrointestinal: Positive for abdominal pain. Negative for abdominal distention, anal bleeding, blood in stool, constipation, diarrhea, nausea, rectal pain and vomiting. Genitourinary: Negative for decreased urine volume, difficulty urinating, dyspareunia, dysuria, enuresis, flank pain, frequency, genital sores, hematuria, menstrual problem, pelvic pain, urgency, vaginal bleeding, vaginal discharge and vaginal pain. Musculoskeletal: Negative for arthralgias, back pain, gait problem, joint swelling, myalgias, neck pain and neck stiffness. Skin: Negative for color change, pallor, rash and wound. Neurological: Negative for dizziness, tremors, seizures, syncope, facial asymmetry, speech difficulty, weakness, light-headedness, numbness and headaches. Hematological: Negative for adenopathy. Does not bruise/bleed easily. Psychiatric/Behavioral: Negative for agitation, behavioral problems, confusion, decreased concentration, dysphoric mood, hallucinations, self-injury, sleep disturbance and suicidal ideas.  The patient is not nervous/anxious and is not hyperactive. Objective:     /80 (Site: Left Arm, Position: Sitting, Cuff Size: Medium Adult)   Pulse 94   Temp 98 °F (36.7 °C) (Tympanic)   Resp 16   Ht 5' 7\" (1.702 m)   Wt 257 lb (116.6 kg)   LMP 08/25/2017 (Approximate)   SpO2 99%   Breastfeeding? No   BMI 40.25 kg/m²     Wt Readings from Last 3 Encounters:   10/26/17 257 lb (116.6 kg)   10/13/17 260 lb 9.6 oz (118.2 kg)   09/27/17 259 lb (117.5 kg)       Physical Exam   Constitutional: She is oriented to person, place, and time. She appears well-developed and well-nourished. overweight   HENT:   Head: Normocephalic. Eyes: Pupils are equal, round, and reactive to light. Neck: Normal range of motion. Cardiovascular: Normal rate. Pulmonary/Chest: Effort normal and breath sounds normal.   Abdominal: Soft. Bowel sounds are normal. There is tenderness. Musculoskeletal: Normal range of motion. Neurological: She is alert and oriented to person, place, and time. Skin: Skin is warm and dry. Psychiatric: She has a normal mood and affect. Vitals reviewed. Assessment/Plan:     Alessandro Colorado was seen today for post-op check. Diagnoses and all orders for this visit:    S/P laparoscopic cholecystectomy        Return if symptoms worsen or fail to improve. Patient Instructions   1. Continue wound care. Monitor for redness, swelling, or purulent drainage. No lotions, ointments, alcohol or peroxide to incision sites. 2. No restrictions     3. Bowel Function: Continue stool softeners as needed. Over the counter- Colace or Miralax is recommended. Do not allow yourself to become constipated     4. Increase water to 64oz per day    5. Ambulate 4 times a day for 15 minutes each time to help increase increase stamina and help stimulate GI motility    6. Continue at home diet- may need to eat smaller more frequent meals     7.  Call for any other the follow symptoms:    Fever over 101 degrees by mouth   Increased redness, warmth, hardness at operative site   Blood soaked dressing (small amounts of oozing may be normal)   Increased or progressive drainage from the surgical area   Inability to urinate or blood in the urine   Pain not relieved by the medications ordered   Persistent nausea and/or vomiting, unable to retain fluids   Pain or swelling in your legs   Shortness of breath or chest pain    8. Signs and symptoms have been reviewed with patient that would be concerning and need her to return to office for re-evaluation. Patient states she will call if she has questions or concerns. 9. May return to work                   Discussed use, benefit, and side effects of prescribed medications. All patient questions answered. Pt voiced understanding.      Electronically signed by Otis Amin CNP on 10/26/2017 at 9:40 AM

## 2017-10-26 NOTE — PATIENT INSTRUCTIONS
1. Continue wound care. Monitor for redness, swelling, or purulent drainage. No lotions, ointments, alcohol or peroxide to incision sites. 2. No restrictions     3. Bowel Function: Continue stool softeners as needed. Over the counter- Colace or Miralax is recommended. Do not allow yourself to become constipated     4. Increase water to 64oz per day    5. Ambulate 4 times a day for 15 minutes each time to help increase increase stamina and help stimulate GI motility    6. Continue at home diet- may need to eat smaller more frequent meals     7. Call for any other the follow symptoms:    Fever over 101 degrees by mouth   Increased redness, warmth, hardness at operative site   Blood soaked dressing (small amounts of oozing may be normal)   Increased or progressive drainage from the surgical area   Inability to urinate or blood in the urine   Pain not relieved by the medications ordered   Persistent nausea and/or vomiting, unable to retain fluids   Pain or swelling in your legs   Shortness of breath or chest pain    8. Signs and symptoms have been reviewed with patient that would be concerning and need her to return to office for re-evaluation. Patient states she will call if she has questions or concerns.     9. May return to work

## 2017-10-31 ENCOUNTER — TELEPHONE (OUTPATIENT)
Dept: FAMILY MEDICINE CLINIC | Age: 22
End: 2017-10-31

## 2017-11-02 ENCOUNTER — HOSPITAL ENCOUNTER (OUTPATIENT)
Dept: GENERAL RADIOLOGY | Age: 22
Discharge: HOME | End: 2017-11-02
Payer: COMMERCIAL

## 2017-11-02 ENCOUNTER — HOSPITAL ENCOUNTER (OUTPATIENT)
Age: 22
End: 2017-11-02
Payer: COMMERCIAL

## 2017-11-02 ENCOUNTER — TELEPHONE (OUTPATIENT)
Dept: FAMILY MEDICINE CLINIC | Age: 22
End: 2017-11-02

## 2017-11-02 ENCOUNTER — OFFICE VISIT (OUTPATIENT)
Dept: FAMILY MEDICINE CLINIC | Age: 22
End: 2017-11-02
Payer: COMMERCIAL

## 2017-11-02 VITALS
RESPIRATION RATE: 16 BRPM | WEIGHT: 256 LBS | HEART RATE: 96 BPM | HEIGHT: 65 IN | SYSTOLIC BLOOD PRESSURE: 114 MMHG | TEMPERATURE: 98.5 F | DIASTOLIC BLOOD PRESSURE: 84 MMHG | BODY MASS INDEX: 42.65 KG/M2

## 2017-11-02 DIAGNOSIS — F41.9 ANXIETY: ICD-10-CM

## 2017-11-02 DIAGNOSIS — R00.2 PALPITATIONS: ICD-10-CM

## 2017-11-02 DIAGNOSIS — E04.1 THYROID NODULE: ICD-10-CM

## 2017-11-02 DIAGNOSIS — R06.09 DOE (DYSPNEA ON EXERTION): ICD-10-CM

## 2017-11-02 DIAGNOSIS — R07.89 OTHER CHEST PAIN: Primary | ICD-10-CM

## 2017-11-02 DIAGNOSIS — E66.01 MORBID OBESITY WITH BMI OF 40.0-44.9, ADULT (HCC): Chronic | ICD-10-CM

## 2017-11-02 DIAGNOSIS — R68.89 HEAT INTOLERANCE: ICD-10-CM

## 2017-11-02 PROCEDURE — 71020 XR CHEST STANDARD TWO VW: CPT

## 2017-11-02 PROCEDURE — 99204 OFFICE O/P NEW MOD 45 MIN: CPT | Performed by: FAMILY MEDICINE

## 2017-11-02 ASSESSMENT — PATIENT HEALTH QUESTIONNAIRE - PHQ9
1. LITTLE INTEREST OR PLEASURE IN DOING THINGS: 0
SUM OF ALL RESPONSES TO PHQ QUESTIONS 1-9: 1
SUM OF ALL RESPONSES TO PHQ9 QUESTIONS 1 & 2: 1
2. FEELING DOWN, DEPRESSED OR HOPELESS: 1

## 2017-11-02 NOTE — TELEPHONE ENCOUNTER
----- Message from Sukumar Nash DO sent at 11/2/2017 12:31 PM EDT -----  Please let pt know that chest xray is negative. Will call back with labs results once available. Let me know if questions, thanks!

## 2017-11-02 NOTE — PROGRESS NOTES
Screening (Age 54 to [de-identified] with 30 pack year hx, current smoker or quit within past 15 years) - n/a    Tetanus - pt will check her records  Influenza Vaccine - declines NOV 2017  Pneumonia Vaccine - age 71  Zostavax - age 60     Breast Cancer Screening - age 44-55  Cervical Cancer Screening - records pending  Osteoporosis Screening - age 71    Falls screening - age 60      No current outpatient prescriptions on file. No current facility-administered medications for this visit. No orders of the defined types were placed in this encounter. All medications reviewed and reconciled, including OTC and herbal medications. Updated list given to patient. Patient Active Problem List    Diagnosis Date Noted    Other chest pain 11/02/2017    POLLARD (dyspnea on exertion) 11/02/2017    Palpitations 11/02/2017    Heat intolerance 11/02/2017    Anxiety 11/02/2017    Thyroid nodule 11/02/2017    Morbid obesity with BMI of 40.0-44.9, adult ([facility] [address]     Gestational hypertension      resolved after pregnancy. no Pre-ecclampsia      S/P cholecystectomy          Past Medical History:   Diagnosis Date    Gestational hypertension     resolved after pregnancy. no Pre-ecclampsia    Morbid obesity with BMI of 40.0-44.9, adult ([facility])     S/P cholecystectomy     Trauma     fall 12/23/16         Past Surgical History:   Procedure Laterality Date    CHOLECYSTECTOMY, LAPAROSCOPIC N/A 10/13/2017    LAP CHOLECYSTECTOMY WITH CHOLANGIOGRAM performed by Lynne Rodriguez MD at [place] [facility]         No Known Allergies      Social History     Social History    Marital status:      Spouse name: N/A    Number of children: N/A    Years of education: N/A     Occupational History    Not on file.      Social History Main Topics    Smoking status: Never Smoker    Smokeless tobacco: Never Used    Alcohol use No    Drug use: No    Sexual activity: Yes     Partners: Male     Other Topics Concern    Not on file     Social released without restrictions. Future Appointments  Date Time Provider Lily Trish   11/30/2017 8:40 AM Sue Bledsoe DO [address]    Counseling was provided today regarding the following topics: Healthy eating habits, Regular exercise, substance abuse and healthy sleep habits. Suad received counseling on the following healthy behaviors: nutrition, exercise and medication adherence    Patient given educational materials on: See Attached    I have instructed Leann Sibley to complete a self tracking handout on Weights and instructed them to bring it with them to her next appointment. Barriers to learning and self management: none    Discussed use, benefit, and side effects of prescribed medications. Barriers to medication compliance addressed. All patient questions answered. Pt voiced understanding.        Electronically signed by Sue Bledsoe DO on 11/2/2017 at 9:02 AM

## 2017-11-02 NOTE — PATIENT INSTRUCTIONS
LAB INSTRUCTIONS:    Please complete labs within 2 week(s). Please fast for 8 hours prior to lab collection. The clinic will call you within 1 week of collection. If you have not heard from us within that amount of time, please call us at [phone]. Patient Education        Chest Pain: Care Instructions  Your Care Instructions  There are many things that can cause chest pain. Some are not serious and will get better on their own in a few days. But some kinds of chest pain need more testing and treatment. Your doctor may have recommended a follow-up visit in the next 8 to 12 hours. If you are not getting better, you may need more tests or treatment. Even though your doctor has released you, you still need to watch for any problems. The doctor carefully checked you, but sometimes problems can develop later. If you have new symptoms or if your symptoms do not get better, get medical care right away. If you have worse or different chest pain or pressure that lasts more than 5 minutes or you passed out (lost consciousness), call 911 or seek other emergency help right away. A medical visit is only one step in your treatment. Even if you feel better, you still need to do what your doctor recommends, such as going to all suggested follow-up appointments and taking medicines exactly as directed. This will help you recover and help prevent future problems. How can you care for yourself at home? · Rest until you feel better. · Take your medicine exactly as prescribed. Call your doctor if you think you are having a problem with your medicine. · Do not drive after taking a prescription pain medicine. When should you call for help? Call 911 if:  · You passed out (lost consciousness). · You have severe difficulty breathing. · You have symptoms of a heart attack. These may include:  ¨ Chest pain or pressure, or a strange feeling in your chest.  ¨ Sweating. ¨ Shortness of breath.   ¨ Nausea or vomiting. ¨ Pain, pressure, or a strange feeling in your back, neck, jaw, or upper belly or in one or both shoulders or arms. ¨ Lightheadedness or sudden weakness. ¨ A fast or irregular heartbeat. After you call 911, the  may tell you to chew 1 adult-strength or 2 to 4 low-dose aspirin. Wait for an ambulance. Do not try to drive yourself. Call your doctor today if:  · You have any trouble breathing. · Your chest pain gets worse. · You are dizzy or lightheaded, or you feel like you may faint. · You are not getting better as expected. · You are having new or different chest pain. Where can you learn more? Go to https://Animated Dynamics.Photofy. org and sign in to your [employer] account. Enter A120 in the Levo League box to learn more about \"Chest Pain: Care Instructions. \"     If you do not have an account, please click on the \"Sign Up Now\" link. Current as of: March 20, 2017  Content Version: 11.3  © 5671-5884 [employer]. Care instructions adapted under license by [facility] ([facility]). If you have questions about a medical condition or this instruction, always ask your healthcare professional. [place] [address] any warranty or liability for your use of this information. Patient Education        Thyroid Nodules: Care Instructions  Your Care Instructions  Thyroid nodules are growths or lumps in the thyroid gland. Your thyroid is in the front of your neck. It controls how your body uses energy. You may have tests to see if the nodule is caused by cancer. Most nodules aren't cancer and don't cause problems. Many don't even need treatment. If you do have cancer, it can usually be cured. Treatment will probably include surgery. You may also get radioactive iodine treatment. If your thyroid can't make thyroid hormone after treatment, you can take a pill every day to replace the hormone. Follow-up care is a key part of your treatment and safety.  Be sure to make and go to all appointments, and call your doctor if you are having problems. It's also a good idea to know your test results and keep a list of the medicines you take. How can you care for yourself at home? · Be safe with medicines. If you take thyroid hormone medicine:  ¨ Take it exactly as prescribed. Call your doctor if you think you are having a problem with your medicine. If you take the right amount and don't skip doses, you probably won't have side effects. ¨ Do not take it with calcium, vitamins, or iron. ¨ Try not to miss a dose. ¨ Do not take extra doses. This will not help you get better any faster. It may also cause side effects. ¨ Tell your doctor about any medicines you take. This includes over-the-counter medicines. ¨ Wear a medical alert bracelet or necklace that says you take thyroid hormones. You can buy these at most drugstores. When should you call for help? Call 911 anytime you think you may need emergency care. For example, call if:  · You lose consciousness. Call your doctor now or seek immediate medical care if:  · You have shortness of breath. Watch closely for changes in your health, and be sure to contact your doctor if:  · You have pain in your neck, jaw, or ear. · You have problems swallowing. · You feel weak and tired. · You have nervousness, a fast heartbeat, hand tremors, problems sleeping, increased sweating, and weight loss. · You do not feel better even though you are taking your medicine. Where can you learn more? Go to https://Stylus Mediajorge albertoDirect Flow Medical.Rev[employer] Foods. org and sign in to your [employer] Path account. Enter T057 in the Diagnose.me box to learn more about \"Thyroid Nodules: Care Instructions. \"     If you do not have an account, please click on the \"Sign Up Now\" link. Current as of: January 31, 2017  Content Version: 11.3  © 4914-3994 [employer]. Care instructions adapted under license by [facility] ([facility]).  If you have questions about a medical taping your eyelids shut at night will keep your eyes from being dry in the morning. · Make sure you get enough calcium. Foods that are rich in calcium include milk, yogurt, cheese, and dark green vegetables. · If you need to gain weight, ask your doctor about special diets. · Do not eat kelp. Magalis Heft is high in iodine, which can make hyperthyroidism worse. Magalis Heft is commonly used in Xirrus and other [employer] foods. You can use iodized salt and eat bread and seafood. Try to eat a balanced diet. · Do not use caffeine and other stimulants. These can make symptoms worse, such as a fast heartbeat, nervousness, and problems focusing. · Do not smoke. Smoking can make your condition worse and may lead to more serious eye problems. If you need help quitting, talk to your doctor about stop-smoking programs and medicines. These can increase your chances of quitting for good. · Lower your stress. Learn to use biofeedback, guided imagery, meditation, or other methods to relax. · Use creams or ointments for irritated skin. Ask your doctor which type to use. · Tell all your doctors about your condition. They need to know because some medicines contain iodine. When should you call for help? Call your doctor now or seek immediate medical care if:  · You have symptoms of a sudden, very high thyroid level (thyroid storm). These include:  ¨ Being nauseated, vomiting, and having diarrhea. ¨ Sweating a lot. ¨ Feeling extremely restless and confused. ¨ Having a high fever. ¨ Having a fast heartbeat. · You have sudden vision changes or eye pain. · You have a fever or severe sore throat and are taking antithyroid medicines, such as PTU or methimazole. Watch closely for changes in your health, and be sure to contact your doctor if:  · You have a sore throat or have problems swallowing. · You have swollen, itchy, or red eyes or your other eye symptoms get worse, or you have new vision problems.   · You have signs of a low thyroid

## 2017-11-03 ENCOUNTER — TELEPHONE (OUTPATIENT)
Dept: FAMILY MEDICINE CLINIC | Age: 22
End: 2017-11-03

## 2017-11-03 DIAGNOSIS — R00.2 PALPITATIONS: Primary | ICD-10-CM

## 2017-11-03 DIAGNOSIS — R07.89 OTHER CHEST PAIN: ICD-10-CM

## 2017-11-03 LAB
ABSOLUTE BASO #: 0.1 K/UL (ref 0–0.1)
ABSOLUTE EOS #: 0.4 K/UL (ref 0.1–0.4)
ABSOLUTE LYMPH #: 2.7 K/UL (ref 0.8–5.2)
ABSOLUTE MONO #: 0.6 K/UL (ref 0.1–0.9)
ABSOLUTE NEUT #: 5.7 K/UL (ref 1.3–9.1)
ALBUMIN SERPL-MCNC: 4.6 G/DL (ref 3.2–5.3)
ALK PHOSPHATASE: 67 IU/L (ref 35–121)
ALT SERPL-CCNC: 30 IU/L (ref 5–59)
ANION GAP SERPL CALCULATED.3IONS-SCNC: 14 MMOL/L
AST SERPL-CCNC: 24 IU/L (ref 10–42)
BASOPHILS RELATIVE PERCENT: 1.3 %
BILIRUB SERPL-MCNC: 0.5 MG/DL (ref 0.2–1.3)
BUN BLDV-MCNC: 8 MG/DL (ref 10–20)
CALCIUM SERPL-MCNC: 10.3 MG/DL (ref 8.7–10.8)
CHLORIDE BLD-SCNC: 103 MMOL/L (ref 95–111)
CHOLESTEROL/HDL RATIO: 4.5
CHOLESTEROL: 209 MG/DL
CO2: 25 MMOL/L (ref 21–32)
CREAT SERPL-MCNC: 0.8 MG/DL (ref 0.5–1.3)
EGFR AFRICAN AMERICAN: 108
EGFR IF NONAFRICAN AMERICAN: 89
EOSINOPHILS RELATIVE PERCENT: 3.8 %
GLUCOSE: 91 MG/DL (ref 70–100)
HCT VFR BLD CALC: 43.8 % (ref 36–48)
HDLC SERPL-MCNC: 46 MG/DL (ref 40–60)
HEMOGLOBIN: 14.7 G/DL (ref 12–16)
LDL CHOLESTEROL CALCULATED: 135 MG/DL
LDL/HDL RATIO: 2.9
LYMPHOCYTE %: 28.8 %
MCH RBC QN AUTO: 28.2 PG (ref 27–34)
MCHC RBC AUTO-ENTMCNC: 33.6 G/DL (ref 31–36)
MCV RBC AUTO: 83.9 FL (ref 80–100)
MONOCYTES # BLD: 6.1 %
NEUTROPHILS RELATIVE PERCENT: 59.8 %
PDW BLD-RTO: 12.9 % (ref 10.8–14.8)
PLATELETS: 270 K/UL (ref 150–450)
POTASSIUM SERPL-SCNC: 4.2 MMOL/L (ref 3.5–5.4)
RBC: 5.22 M/UL (ref 4–5.5)
SODIUM BLD-SCNC: 138 MMOL/L (ref 134–147)
TOTAL PROTEIN: 7.3 G/DL (ref 5.8–8)
TRIGL SERPL-MCNC: 139 MG/DL
TSH SERPL DL<=0.05 MIU/L-ACNC: 1.4 UIU/ML (ref 0.4–4.4)
VLDLC SERPL CALC-MCNC: 28 MG/DL
WBC: 9.5 K/UL (ref 3.7–10.8)

## 2017-11-03 NOTE — TELEPHONE ENCOUNTER
Called pt with results of labs  Have leave VM  Left detailed message detailing plan    \"labs normal, including thyroid, as discussed in clinic, plan is to pursue further cardiac testing: stress test/echo\"    Pt is to call on Monday to f/u    Please call pt to set up testing and answer any questions. Thanks! ASSESSMENT & PLAN  1. Palpitations    - (Gxt) Stress Test Exercise W Out Myoview; Future  - Holter Monitor 48 Hour; Future    2. Other chest pain    - (Gxt) Stress Test Exercise W Out Myoview;  Future  - Holter Monitor 48 Hour; Future        Office Visit on 11/02/2017   Component Date Value Ref Range Status    Cholesterol 11/03/2017 209* <200 mg/dl Final    Triglycerides 11/03/2017 139  <150 mg/dl Final    HDL 11/03/2017 46  40 - 60 mg/dl Final    LDL Calculated 11/03/2017 135* <130 mg/dL Final    VLDL CHOLESTEROL CALCULATED 11/03/2017 28  <30 mg/dL Final    LDl/HDL Ratio 11/03/2017 2.9  <3.5 Final    Chol/HDL Ratio 11/03/2017 4.5  <5 Final    Glucose 11/03/2017 91  70 - 100 mg/dL Final    BUN 11/03/2017 8* 10 - 20 mg/dl Final    CREATININE 11/03/2017 0.8  0.5 - 1.3 mg/dl Final    eGFR  11/03/2017 108  >60 Final    EGFR IF NonAfrican American 11/03/2017 89  >60 Final    Calcium 11/03/2017 10.3  8.7 - 10.8 mg/dl Final    Sodium 11/03/2017 138  134 - 147 mmol/L Final    Potassium 11/03/2017 4.2  3.5 - 5.4 mmol/L Final    Chloride 11/03/2017 103  95 - 111 mmol/L Final    CO2 11/03/2017 25  21 - 32 mmol/L Final    Anion Gap 11/03/2017 14   Final    Total Bilirubin 11/03/2017 0.5  0.2 - 1.3 mg/dl Final    Alk Phosphatase 11/03/2017 67  35 - 121 IU/L Final    AST 11/03/2017 24  10 - 42 IU/L Final    ALT 11/03/2017 30  5 - 59 IU/L Final    Total Protein 11/03/2017 7.3  5.8 - 8.0 g/dl Final    Alb 11/03/2017 4.6  3.2 - 5.3 g/dl Final    WBC 11/03/2017 9.5  3.7 - 10.8 K/ul Final    RBC 11/03/2017 5.22  4.00 - 5.50 M/ul Final    Hemoglobin 11/03/2017 14.7  12.0 - 16.0 g/dL Final    Hematocrit 11/03/2017 43.8  36.0 - 48.0 % Final    MCV 11/03/2017 83.9  80 - 100 fl Final    MCH 11/03/2017 28.2  27.0 - 34.0 pg Final    MCHC 11/03/2017 33.6  31.0 - 36.0 g/dl Final    RDW 11/03/2017 12.9  10.8 - 14.8 % Final    Platelets 39/25/2663 270  150 - 450 K/ul Final    Absolute Neut # 11/03/2017 5.7  1.3 - 9.1 K/ul Final    Neutrophils % 11/03/2017 59.8  % Final    Absolute Lymph # 11/03/2017 2.7  0.8 - 5.2 K/ul Final    Lymphocyte % 11/03/2017 28.8  % Final    Absolute Mono # 11/03/2017 0.6  0.1 - 0.9 K/ul Final    Monocytes 11/03/2017 6.1  % Final    Absolute Eos # 11/03/2017 0.4  0.1 - 0.4 K/ul Final    Eosinophils % 11/03/2017 3.8  % Final    Absolute Baso # 11/03/2017 0.1  0.0 - 0.1 K/ul Final    Basophils % 11/03/2017 1.3  % Final    TSH 11/03/2017 1.400  0.40 - 4.40 uIU/mL Final

## 2017-11-06 ENCOUNTER — TELEPHONE (OUTPATIENT)
Dept: FAMILY MEDICINE CLINIC | Age: 22
End: 2017-11-06

## 2017-11-14 ENCOUNTER — HOSPITAL ENCOUNTER (OUTPATIENT)
Dept: NON INVASIVE DIAGNOSTICS | Age: 22
Discharge: HOME | End: 2017-11-14
Payer: COMMERCIAL

## 2017-11-14 ENCOUNTER — HOSPITAL ENCOUNTER (OUTPATIENT)
Dept: ULTRASOUND IMAGING | Age: 22
Discharge: HOME | End: 2017-11-14
Payer: COMMERCIAL

## 2017-11-14 ENCOUNTER — TELEPHONE (OUTPATIENT)
Dept: FAMILY MEDICINE CLINIC | Age: 22
End: 2017-11-14

## 2017-11-14 DIAGNOSIS — R00.2 PALPITATIONS: ICD-10-CM

## 2017-11-14 DIAGNOSIS — R06.09 DOE (DYSPNEA ON EXERTION): ICD-10-CM

## 2017-11-14 DIAGNOSIS — E04.1 THYROID NODULE: ICD-10-CM

## 2017-11-14 LAB
LV EF: 58 %
LVEF MODALITY: NORMAL

## 2017-11-14 PROCEDURE — 93306 TTE W/DOPPLER COMPLETE: CPT

## 2017-11-14 PROCEDURE — 76536 US EXAM OF HEAD AND NECK: CPT

## 2017-11-14 NOTE — TELEPHONE ENCOUNTER
Per HIPAA, left detailed message for patient letting them know the results of their thyroid US. Patient was advised to call the office with any questions.

## 2017-11-15 ENCOUNTER — HOSPITAL ENCOUNTER (OUTPATIENT)
Dept: NON INVASIVE DIAGNOSTICS | Age: 22
Discharge: HOME | End: 2017-11-15
Payer: COMMERCIAL

## 2017-11-15 VITALS — HEIGHT: 65 IN | WEIGHT: 256 LBS | BODY MASS INDEX: 42.65 KG/M2

## 2017-11-15 DIAGNOSIS — R00.2 PALPITATIONS: ICD-10-CM

## 2017-11-15 DIAGNOSIS — R07.89 OTHER CHEST PAIN: ICD-10-CM

## 2017-11-15 PROCEDURE — 93017 CV STRESS TEST TRACING ONLY: CPT | Performed by: INTERNAL MEDICINE

## 2017-11-15 PROCEDURE — 93226 XTRNL ECG REC<48 HR SCAN A/R: CPT

## 2017-11-15 PROCEDURE — 93225 XTRNL ECG REC<48 HRS REC: CPT

## 2017-11-20 ENCOUNTER — TELEPHONE (OUTPATIENT)
Dept: FAMILY MEDICINE CLINIC | Age: 22
End: 2017-11-20

## 2017-11-20 DIAGNOSIS — R06.09 DOE (DYSPNEA ON EXERTION): ICD-10-CM

## 2017-11-20 DIAGNOSIS — R07.89 OTHER CHEST PAIN: ICD-10-CM

## 2017-11-20 DIAGNOSIS — R94.39 ABNORMAL CARDIOVASCULAR STRESS TEST: Primary | ICD-10-CM

## 2017-11-20 DIAGNOSIS — R00.2 PALPITATIONS: ICD-10-CM

## 2017-11-20 NOTE — TELEPHONE ENCOUNTER
----- Message from Ivanna Mercado DO sent at 11/20/2017 12:08 PM EST -----  Reviewed stress test with Dr. Ivonne Pruitt. He is recommending CT angiogram or coronary arteries to r/o a anomalous coronary vasculature. Reviewed results with pt as well. Also reviewed results of echo. All questions answered. CT ordered. Reviewed ER precautions, pt understands.

## 2017-11-21 ENCOUNTER — TELEPHONE (OUTPATIENT)
Dept: FAMILY MEDICINE CLINIC | Age: 22
End: 2017-11-21

## 2017-11-21 DIAGNOSIS — R94.39 ABNORMAL STRESS TEST: Primary | ICD-10-CM

## 2017-11-21 NOTE — TELEPHONE ENCOUNTER
We are to tell central scheduling with [facility] that this test needs scheduled with Minerva Morrison in IR. This will need scheduled tomorrow 11/22/17.

## 2017-11-22 ENCOUNTER — HOSPITAL ENCOUNTER (EMERGENCY)
Age: 22
Discharge: HOME | End: 2017-11-22
Attending: FAMILY MEDICINE
Payer: COMMERCIAL

## 2017-11-22 ENCOUNTER — APPOINTMENT (OUTPATIENT)
Dept: GENERAL RADIOLOGY | Age: 22
End: 2017-11-22
Payer: COMMERCIAL

## 2017-11-22 VITALS
WEIGHT: 256 LBS | TEMPERATURE: 97.9 F | HEART RATE: 104 BPM | HEIGHT: 67 IN | SYSTOLIC BLOOD PRESSURE: 165 MMHG | BODY MASS INDEX: 40.18 KG/M2 | OXYGEN SATURATION: 99 % | RESPIRATION RATE: 16 BRPM | DIASTOLIC BLOOD PRESSURE: 87 MMHG

## 2017-11-22 DIAGNOSIS — S39.012A STRAIN OF LUMBAR REGION, INITIAL ENCOUNTER: Primary | ICD-10-CM

## 2017-11-22 DIAGNOSIS — M54.31 SCIATICA OF RIGHT SIDE: ICD-10-CM

## 2017-11-22 LAB
AMPHETAMINE+METHAMPHETAMINE URINE SCREEN: NEGATIVE
BACTERIA: ABNORMAL /HPF
BARBITURATE QUANTITATIVE URINE: NEGATIVE
BENZODIAZEPINE QUANTITATIVE URINE: NEGATIVE
BILIRUBIN URINE: NEGATIVE
BLOOD, URINE: NEGATIVE
CANNABINOID QUANTITATIVE URINE: NEGATIVE
CASTS 2: ABNORMAL /LPF
CASTS UA: ABNORMAL /LPF
CHARACTER, URINE: ABNORMAL
COCAINE METABOLITE QUANTITATIVE URINE: NEGATIVE
COLOR: YELLOW
CRYSTALS, UA: ABNORMAL
EPITHELIAL CELLS, UA: ABNORMAL /HPF
GLUCOSE URINE: NEGATIVE MG/DL
KETONES, URINE: NEGATIVE
LEUKOCYTE ESTERASE, URINE: NEGATIVE
MISCELLANEOUS 2: ABNORMAL
NITRITE, URINE: NEGATIVE
OPIATES, URINE: NEGATIVE
OXYCODONE: NEGATIVE
PH UA: 6.5
PHENCYCLIDINE QUANTITATIVE URINE: NEGATIVE
PREGNANCY, SERUM: NEGATIVE
PROTEIN UA: NEGATIVE
RBC URINE: ABNORMAL /HPF
RENAL EPITHELIAL, UA: ABNORMAL
SPECIFIC GRAVITY, URINE: 1.01 (ref 1–1.03)
UROBILINOGEN, URINE: 0.2 EU/DL
WBC UA: ABNORMAL /HPF
YEAST: ABNORMAL

## 2017-11-22 PROCEDURE — 73502 X-RAY EXAM HIP UNI 2-3 VIEWS: CPT

## 2017-11-22 PROCEDURE — 84703 CHORIONIC GONADOTROPIN ASSAY: CPT

## 2017-11-22 PROCEDURE — 80307 DRUG TEST PRSMV CHEM ANLYZR: CPT

## 2017-11-22 PROCEDURE — 81001 URINALYSIS AUTO W/SCOPE: CPT

## 2017-11-22 PROCEDURE — 99284 EMERGENCY DEPT VISIT MOD MDM: CPT

## 2017-11-22 PROCEDURE — 96374 THER/PROPH/DIAG INJ IV PUSH: CPT

## 2017-11-22 PROCEDURE — 96372 THER/PROPH/DIAG INJ SC/IM: CPT

## 2017-11-22 PROCEDURE — 6360000002 HC RX W HCPCS: Performed by: FAMILY MEDICINE

## 2017-11-22 PROCEDURE — 72100 X-RAY EXAM L-S SPINE 2/3 VWS: CPT

## 2017-11-22 PROCEDURE — 36415 COLL VENOUS BLD VENIPUNCTURE: CPT

## 2017-11-22 RX ORDER — KETOROLAC TROMETHAMINE 30 MG/ML
30 INJECTION, SOLUTION INTRAMUSCULAR; INTRAVENOUS ONCE
Status: COMPLETED | OUTPATIENT
Start: 2017-11-22 | End: 2017-11-22

## 2017-11-22 RX ORDER — ORPHENADRINE CITRATE 30 MG/ML
60 INJECTION INTRAMUSCULAR; INTRAVENOUS ONCE
Status: COMPLETED | OUTPATIENT
Start: 2017-11-22 | End: 2017-11-22

## 2017-11-22 RX ORDER — CYCLOBENZAPRINE HCL 10 MG
10 TABLET ORAL 2 TIMES DAILY PRN
Qty: 10 TABLET | Refills: 0 | Status: SHIPPED | OUTPATIENT
Start: 2017-11-22 | End: 2017-11-30

## 2017-11-22 RX ORDER — METHYLPREDNISOLONE 4 MG/1
TABLET ORAL
Qty: 1 KIT | Refills: 0 | Status: SHIPPED | OUTPATIENT
Start: 2017-11-22 | End: 2017-11-28

## 2017-11-22 RX ADMIN — KETOROLAC TROMETHAMINE 30 MG: 30 INJECTION, SOLUTION INTRAMUSCULAR at 22:05

## 2017-11-22 RX ADMIN — ORPHENADRINE CITRATE 60 MG: 30 INJECTION INTRAMUSCULAR; INTRAVENOUS at 22:05

## 2017-11-22 ASSESSMENT — ENCOUNTER SYMPTOMS
SHORTNESS OF BREATH: 0
DIARRHEA: 0
NAUSEA: 0
COUGH: 0
WHEEZING: 0
RHINORRHEA: 0
VOMITING: 0
EYE PAIN: 0
BACK PAIN: 1
SORE THROAT: 0
ABDOMINAL PAIN: 0
EYE DISCHARGE: 0

## 2017-11-22 ASSESSMENT — PAIN SCALES - GENERAL
PAINLEVEL_OUTOF10: 8
PAINLEVEL_OUTOF10: 8

## 2017-11-22 ASSESSMENT — PAIN DESCRIPTION - DIRECTION: RADIATING_TOWARDS: LEG

## 2017-11-22 ASSESSMENT — PAIN DESCRIPTION - PAIN TYPE: TYPE: ACUTE PAIN

## 2017-11-22 ASSESSMENT — PAIN DESCRIPTION - FREQUENCY: FREQUENCY: CONTINUOUS

## 2017-11-22 ASSESSMENT — PAIN DESCRIPTION - DESCRIPTORS: DESCRIPTORS: CONSTANT

## 2017-11-22 ASSESSMENT — PAIN DESCRIPTION - LOCATION: LOCATION: BACK;HIP

## 2017-11-22 ASSESSMENT — PAIN DESCRIPTION - ONSET: ONSET: SUDDEN

## 2017-11-22 NOTE — TELEPHONE ENCOUNTER
Spoke with Karen Hem @  [facility] office, she will forward this message to Dr Peggy Burns for clarification.

## 2017-11-22 NOTE — TELEPHONE ENCOUNTER
Can you speak with cardiology office today? Radiology and Dr. Chris Martines may need to discuss this and let us know what needs ordered    The CT that radiology is recommending \"[id number]\" is not what Dr. Chris Martines had recommended. I had asked him specifically if this was the correct one ([id number]) and he told me \"No, Order CT Angio, not structural. Just write assess coronaries for anomalies\". Which is what I did. So I guess we need some clarification between cardiology and radiology before scheduling and putting pt through the test.     Thanks!     Future Appointments  Date Time Provider Lily Chambers   11/30/2017 8:40 AM Samantha Hahn, [facility]

## 2017-11-23 NOTE — ED PROVIDER NOTES
MD Wanda:    None    RADIOLOGY: non-plain film images(s) such as CT, Ultrasound and MRI are read by the radiologist.    XR LUMBAR SPINE (2-3 VIEWS)   Final Result   1. The mildly compressed appearance of the superior endplate of L1 and L2 may be secondary to the childhood trauma with history of fracture as per the patient. 2.  No acute fractures or acute appearing subluxations. **This report has been created using voice recognition software. It may contain minor errors which are inherent in voice recognition technology. **      Final report electronically signed by Dr. Josr Siddiqi on 11/22/2017 10:25 PM      XR HIP RIGHT (2-3 VIEWS)   Final Result   Normal 2 views of the right hip joint, and the surrounding soft tissues. **This report has been created using voice recognition software. It may contain minor errors which are inherent in voice recognition technology. **      Final report electronically signed by Dr. Josr Siddiqi on 11/22/2017 10:23 PM          LABS:   Labs Reviewed   URINE WITH REFLEXED MICRO - Abnormal; Notable for the following:        Result Value    Character, Urine CLOUDY (*)     All other components within normal limits   HCG, SERUM, QUALITATIVE   URINE DRUG SCREEN       EMERGENCY DEPARTMENT COURSE:   Vitals:    Vitals:    11/22/17 2114   BP: (!) 165/87   Pulse: 104   Resp: 16   Temp: 97.9 °F (36.6 °C)   TempSrc: Oral   SpO2: 99%   Weight: 256 lb (116.1 kg)   Height: 5' 7\" (1.702 m)       9:33 PM: The patient was seen and evaluated. 9:42 PM: The patient received Toradol and Norflex for her symptoms. MDM:  The patient was seen and evaluated within the ED today following lower back pain and right hip pain. Within the department, I observed the patient's vital signs to be hypertensive. On exam, I appreciated bilateral paraspinal muscular and midline tenderness. Radiological studies within the department revealed no acute fracture.  Laboratory work was reassuring. Within the department, the patient was treated with Toradol and Norflex for her symptoms. I observed the patient's condition to improve during the duration of their stay. The patient states that her pain has subsided with receiving the medication. I explained my proposed course of treatment to the patient, and they were amenable to my decision. They were discharged home with Flexeril and Medrol. I instructed her not to drive or operative heavy machinery while on Flexeril, and take Motrin on a full stomach. She agrees to return to the ED if their symptoms become more severe in nature, or otherwise change. CRITICAL CARE:   None     CONSULTS:  None    PROCEDURES:  None     FINAL IMPRESSION      1. Strain of lumbar region, initial encounter    2. Sciatica of right side          DISPOSITION/PLAN   Patient was discharged in stable condition. Will return if symptoms change or worsen, or for any sign or symptom deemed emergent by the patient or family members. Follow up as an outpatient, sooner if symptoms warrant. PATIENT REFERRED TO:  No follow-up provider specified. DISCHARGE MEDICATIONS:  New Prescriptions    CYCLOBENZAPRINE (FLEXERIL) 10 MG TABLET    Take 1 tablet by mouth 2 times daily as needed for Muscle spasms Don't drive or use machines while taking this medicine    METHYLPREDNISOLONE (MEDROL, SUZETTE,) 4 MG TABLET    As Directed       (Please note that portions of this note were completed with a voice recognition program.  Efforts were made to edit the dictations but occasionally words are mis-transcribed.)    Scribe:  Ghanshyam Musa 11/22/17 9:53 PM Scribing for and in the presence of Oralia Brooks MD.    Signed by: Ghanshyam Musa, [address] [place] [address], 11/22/17 11:06 PM    Provider:  I personally performed the services described in the documentation, reviewed and edited the documentation which was dictated to the scribe in my presence, and it accurately records my words and actions.     Genesis Lange MD Wanda 11/22/17 11:06 PM                          Nicolette Collet, MD  11/22/17 3382

## 2017-11-30 ENCOUNTER — OFFICE VISIT (OUTPATIENT)
Dept: FAMILY MEDICINE CLINIC | Age: 22
End: 2017-11-30
Payer: COMMERCIAL

## 2017-11-30 VITALS
TEMPERATURE: 98.7 F | DIASTOLIC BLOOD PRESSURE: 86 MMHG | HEART RATE: 87 BPM | BODY MASS INDEX: 40.65 KG/M2 | WEIGHT: 259 LBS | SYSTOLIC BLOOD PRESSURE: 124 MMHG | RESPIRATION RATE: 16 BRPM | HEIGHT: 67 IN

## 2017-11-30 DIAGNOSIS — F41.9 ANXIETY: ICD-10-CM

## 2017-11-30 DIAGNOSIS — R00.2 PALPITATIONS: ICD-10-CM

## 2017-11-30 DIAGNOSIS — R07.89 OTHER CHEST PAIN: Primary | ICD-10-CM

## 2017-11-30 DIAGNOSIS — E04.1 THYROID NODULE: ICD-10-CM

## 2017-11-30 DIAGNOSIS — R94.39 ABNORMAL CARDIOVASCULAR STRESS TEST: ICD-10-CM

## 2017-11-30 DIAGNOSIS — R06.09 DOE (DYSPNEA ON EXERTION): ICD-10-CM

## 2017-11-30 PROCEDURE — 99214 OFFICE O/P EST MOD 30 MIN: CPT | Performed by: FAMILY MEDICINE

## 2017-11-30 NOTE — PROGRESS NOTES
Chief Complaint   Patient presents with    Follow-up     issues below, testing       History obtained from the patient. SUBJECTIVE:  Cami Reddy is a 20 y.o. female that presents today for     1.) CP/SOB LAST VISIT: seen by cardio already for this. Going on for the last few months. Pt will feel a C-shape around her heart and will feel chest pain and pressure. Will get the squeezing pressure at least once a day, lasts minutes to hours. No obvious triggers. Will happen at rest and with exertion. Saw cardio once. Had EKG, told things were ok, as was low risk. She was then cleared for GB surgery. Initially though maybe the GB was causing some CP. Never had cxr or stress testing or holter. No sig fam hx of CAD. Some great uncles with some ? Heart dz. No other fam hx of heart dz. No cough or wheezing. But does have POLLARD that is new as well. No CP today. UPDATE TODAY: no change in above. No better or worse. Had abnormal stress. Case d/w cardio, recommended CTA of the coronary arteries. That is ordered and scheduled for tomorrow. This is to r/o anomalous coronary arteries. No CP/SOB today      2.) LAST VISIT: Pt told by a cardiologist she was seeing to have her thyroid checked. Pt not sure why. Maternal aunt with some sort of thyroid issues. No one else in the family with thyroid problems. Always feels warm. Periods are regular. Feels like her heart races at times. + anxiety as well. Heart racing since delivery in FEB on and off. Anxiety started the last few months. She's also noted a nodule in her throat around where thyroid is. Firm. Non-tender. Been there a few months. UPDATE TODAY: here to f/u on labs and thyroid US. Still having sxs as above. holter monitor pending, completed last wk. Cardiac w/u as above. Nodule in her thyroid is better. 3.) anxiety LAST VISIT: last few months feeling very anxious and restless. Mind racing. Never able to relax. No prior hx of anxiety.  No prior hx of urination, Flank pain, Change in frequency, Urgency  Skin:  Color change, Rash, Itching, Wound  Musculoskeletal:  Joint pain, Back pain, Gait problems, Joint swelling, Myalgias  Neurological:  Dizziness, Headaches, Presyncope, Numbness, Seizures, Tremors  Endocrine:  Heat Intolerance, Cold Intolerance, Polydipsia, Polyphagia, Polyuria      PHYSICAL EXAM:  Vitals:    11/30/17 0828   BP: 124/86   Pulse: 87   Resp: 16   Temp: 98.7 °F (37.1 °C)   Weight: 259 lb (117.5 kg)   Height: 5' 7\" (1.702 m)     Body mass index is 40.57 kg/m². Pain Score:   3 (low back)    VS Reviewed  General Appearance: A&O x 3, No acute distress,well developed and well- nourished  Eyes: pupils equal, round, and reactive to light, extraocular eye movements intact, conjunctivae and eye lids without erythema  ENT: external ear and ear canal clear bilaterally, TMs intact and regular, nose without deformity, nasal mucosa and turbinates normal without polyps, oropharynx normal, dentition is normal for age  Neck: supple and non-tender without mass, no thyromegaly or thyroid nodules, no cervical lymphadenopathy  Pulmonary/Chest: clear to auscultation bilaterally- no wheezes, rales or rhonchi, normal air movement, no respiratory distress or retractions  Cardiovascular: S1 and S2 auscultated w/ RRR. No murmurs, rubs, clicks, or gallops, distal pulses intact. Abdomen: soft, non-tender, non-distended, bowl sounds physiologic,  no rebound or guarding, no masses or hernias noted. Liver and spleen without enlargement. Extremities: no cyanosis, clubbing or edema of the lower extremities. Skin: warm and dry, no rash or erythema  Psych: Affect appropriate. Mood euthymic. Thought process is normal without evidence of depression or psychosis. Good insight and appropriate interaction. Cognition and memory appear to be intact.       Admission on 11/22/2017, Discharged on 11/22/2017   Component Date Value Ref Range Status    Preg, Serum 11/22/2017 NEGATIVE 135* <130 mg/dL Final    VLDL CHOLESTEROL CALCULATED 11/03/2017 28  <30 mg/dL Final    LDl/HDL Ratio 11/03/2017 2.9  <3.5 Final    Chol/HDL Ratio 11/03/2017 4.5  <5 Final    Glucose 11/03/2017 91  70 - 100 mg/dL Final    BUN 11/03/2017 8* 10 - 20 mg/dl Final    CREATININE 11/03/2017 0.8  0.5 - 1.3 mg/dl Final    eGFR  11/03/2017 108  >60 Final    EGFR IF NonAfrican American 11/03/2017 89  >60 Final    Calcium 11/03/2017 10.3  8.7 - 10.8 mg/dl Final    Sodium 11/03/2017 138  134 - 147 mmol/L Final    Potassium 11/03/2017 4.2  3.5 - 5.4 mmol/L Final    Chloride 11/03/2017 103  95 - 111 mmol/L Final    CO2 11/03/2017 25  21 - 32 mmol/L Final    Anion Gap 11/03/2017 14   Final    Total Bilirubin 11/03/2017 0.5  0.2 - 1.3 mg/dl Final    Alk Phosphatase 11/03/2017 67  35 - 121 IU/L Final    AST 11/03/2017 24  10 - 42 IU/L Final    ALT 11/03/2017 30  5 - 59 IU/L Final    Total Protein 11/03/2017 7.3  5.8 - 8.0 g/dl Final    Alb 11/03/2017 4.6  3.2 - 5.3 g/dl Final    WBC 11/03/2017 9.5  3.7 - 10.8 K/ul Final    RBC 11/03/2017 5.22  4.00 - 5.50 M/ul Final    Hemoglobin 11/03/2017 14.7  12.0 - 16.0 g/dL Final    Hematocrit 11/03/2017 43.8  36.0 - 48.0 % Final    MCV 11/03/2017 83.9  80 - 100 fl Final    MCH 11/03/2017 28.2  27.0 - 34.0 pg Final    MCHC 11/03/2017 33.6  31.0 - 36.0 g/dl Final    RDW 11/03/2017 12.9  10.8 - 14.8 % Final    Platelets 51/55/9889 270  150 - 450 K/ul Final    Absolute Neut # 11/03/2017 5.7  1.3 - 9.1 K/ul Final    Neutrophils % 11/03/2017 59.8  % Final    Absolute Lymph # 11/03/2017 2.7  0.8 - 5.2 K/ul Final    Lymphocyte % 11/03/2017 28.8  % Final    Absolute Mono # 11/03/2017 0.6  0.1 - 0.9 K/ul Final    Monocytes 11/03/2017 6.1  % Final    Absolute Eos # 11/03/2017 0.4  0.1 - 0.4 K/ul Final    Eosinophils % 11/03/2017 3.8  % Final    Absolute Baso # 11/03/2017 0.1  0.0 - 0.1 K/ul Final    Basophils % 11/03/2017 1.3  % Final    TSH

## 2017-11-30 NOTE — TELEPHONE ENCOUNTER
Left message with Felice Blackwell at central scheduling to call the office. Spoke with Desiree Galan letting her know her appointment for tomorrow was cancelled and we would contact her when the new test was scheduled.

## 2017-11-30 NOTE — PROCEDURES
[address] [place] [address]                                  HOLTER MONITOR    PATIENT NAME: Sharon Estrada                    :        1995  MED REC NO:   [id number]                           ROOM:       022  ACCOUNT NO:   [de-identified]                           ADMIT DATE: 2017  PROVIDER:     Thais Campos MD    HOLTER MONITOR 48-HOURS    DATE OF STUDY:  11/15/2017    INDICATION:  Tachycardia with irregular heart beat. MONITORING DURATION:  48 hours. MONITORIN/15/2017 to 2017    FINDINGS:  The patient demonstrated predominant heart rhythm of sinus. The  minimum heart rate was 54, the maximum heart rate was 157. Average heart  rate was 87. All of these heart rates were during a rhythm of sinus. 33%  of the monitoring period was found to be in tachycardia. 1% of the  monitoring period was found to be in bradycardia. The max RR interval was  1.25 seconds. There were 233 supraventricular isolated beats. There were  23 ventricular isolated beats. The rhythm strips available for evaluation,  there was no evidence of SVT, ventricular rhythm, or pauses. On 11/15/2017 at 18:02, the patient reported shortness of breath. Her  rhythm was sinus tachycardia with an isolated PVC. Heart rate was 114  beats per minute at that time. The patient reported chest pain on  11/15/2017 at 18:18 and her monitoring strip revealed sinus rhythm with a  heart rate of 91 beats per minute. There were no ST or T-wave changes. SUMMARY:  The patient with sinus rhythm. The heart rate is rated between  54 and 157 beats per minute. Average finding was a sinus rhythm, with a  heart rate of 87 beats per minute. CONCLUSION:  1. No obvious findings of SVT or malignant ventricular rhythm. 2.  No significant pauses (max RR interval of 1.25 seconds).   3.  Symptoms do not correlate with any rhythm disturbances or  electrocardiographic changes suggestive of ischemia.          Elbert Darden MD    D: 11/29/2017 16:53:20       T: 11/29/2017 23:29:12     [email or web address]  Job#: [id number]     Doc#: [id number]    CC:

## 2017-11-30 NOTE — PATIENT INSTRUCTIONS
upper belly or in one or both shoulders or arms. ¨ Lightheadedness or sudden weakness. ¨ A fast or irregular heartbeat. After you call 911, the  may tell you to chew 1 adult-strength or 2 to 4 low-dose aspirin. Wait for an ambulance. Do not try to drive yourself. Call your doctor today if:  · You have any trouble breathing. · Your chest pain gets worse. · You are dizzy or lightheaded, or you feel like you may faint. · You are not getting better as expected. · You are having new or different chest pain. Where can you learn more? Go to https://LurnQ.[employer]. org and sign in to your [employer] account. Enter A120 in the SupportLocal box to learn more about \"Chest Pain: Care Instructions. \"     If you do not have an account, please click on the \"Sign Up Now\" link. Current as of: March 20, 2017  Content Version: 11.3  © 4453-7785 [employer]. Care instructions adapted under license by [facility] ([facility]). If you have questions about a medical condition or this instruction, always ask your healthcare professional. [place] [address] any warranty or liability for your use of this information.

## 2017-12-04 ENCOUNTER — TELEPHONE (OUTPATIENT)
Dept: FAMILY MEDICINE CLINIC | Age: 22
End: 2017-12-04

## 2017-12-04 NOTE — TELEPHONE ENCOUNTER
No, would recommend against the CT scan  At this point I'd recommend she make a f/u apt with Dr. Kimberly Ferrara, cardio, to discuss options. She should just be able to make an apt since seen prior.      Future Appointments  Date Time Provider Lily Chambers   12/11/2017 7:30 AM STR CT HOLDING RM 1 STRZ CT SCAN STR Radiolog   12/11/2017 8:30 AM STR CT IMAGING RM1 STRZ CT SCAN STR Radiolog   12/11/2017 10:00 AM STR CT HOLDING RM 1 STRZ CT SCAN STR Radiolog   12/29/2017 7:40 AM Gomez Charles DO [facility] - [place]

## 2017-12-04 NOTE — TELEPHONE ENCOUNTER
Pt states she just found out yesterday she is pregnant and she wants to know if she can still have the CTA done.

## 2017-12-04 NOTE — TELEPHONE ENCOUNTER
Please let pt know that full holter report not back yet, but preliminary is negative for any sort of abnormal rhythm. Let me know if questions, thanks!     Future Appointments  Date Time Provider Lily Chambers   12/11/2017 7:30 AM STR CT HOLDING RM 1 STRZ CT SCAN STR Radiolog   12/11/2017 8:30 AM STR CT IMAGING RM1 STRZ CT SCAN STR Radiolog   12/11/2017 10:00 AM STR CT HOLDING RM 1 STRZ CT SCAN STR Radiolog   12/29/2017 7:40 AM [facility] [address] [facility]

## 2017-12-21 ENCOUNTER — APPOINTMENT (OUTPATIENT)
Dept: ULTRASOUND IMAGING | Age: 22
End: 2017-12-21
Payer: COMMERCIAL

## 2017-12-21 ENCOUNTER — HOSPITAL ENCOUNTER (EMERGENCY)
Age: 22
Discharge: HOME | End: 2017-12-21
Attending: EMERGENCY MEDICINE
Payer: COMMERCIAL

## 2017-12-21 VITALS
OXYGEN SATURATION: 100 % | TEMPERATURE: 98.5 F | HEIGHT: 67 IN | WEIGHT: 259 LBS | DIASTOLIC BLOOD PRESSURE: 88 MMHG | RESPIRATION RATE: 18 BRPM | HEART RATE: 82 BPM | BODY MASS INDEX: 40.65 KG/M2 | SYSTOLIC BLOOD PRESSURE: 142 MMHG

## 2017-12-21 DIAGNOSIS — K76.0 HEPATIC STEATOSIS: Primary | ICD-10-CM

## 2017-12-21 LAB
ALBUMIN SERPL-MCNC: 4.1 G/DL (ref 3.5–5.1)
ALP BLD-CCNC: 81 U/L (ref 38–126)
ALT SERPL-CCNC: 195 U/L (ref 11–66)
ANION GAP SERPL CALCULATED.3IONS-SCNC: 14 MEQ/L (ref 8–16)
AST SERPL-CCNC: 289 U/L (ref 5–40)
BACTERIA: ABNORMAL
BASOPHILS # BLD: 0.4 %
BASOPHILS ABSOLUTE: 0 THOU/MM3 (ref 0–0.1)
BILIRUB SERPL-MCNC: 1.2 MG/DL (ref 0.3–1.2)
BILIRUBIN URINE: ABNORMAL
BLOOD, URINE: NEGATIVE
BUN BLDV-MCNC: 6 MG/DL (ref 7–22)
CALCIUM SERPL-MCNC: 8.8 MG/DL (ref 8.5–10.5)
CASTS: ABNORMAL /LPF
CASTS: ABNORMAL /LPF
CHARACTER, URINE: ABNORMAL
CHLORIDE BLD-SCNC: 103 MEQ/L (ref 98–111)
CO2: 22 MEQ/L (ref 23–33)
COLOR: ABNORMAL
CREAT SERPL-MCNC: 0.5 MG/DL (ref 0.4–1.2)
CRYSTALS: ABNORMAL
EKG ATRIAL RATE: 77 BPM
EKG P AXIS: 6 DEGREES
EKG P-R INTERVAL: 128 MS
EKG Q-T INTERVAL: 394 MS
EKG QRS DURATION: 86 MS
EKG QTC CALCULATION (BAZETT): 445 MS
EKG R AXIS: 66 DEGREES
EKG T AXIS: 37 DEGREES
EKG VENTRICULAR RATE: 77 BPM
EOSINOPHIL # BLD: 0.8 %
EOSINOPHILS ABSOLUTE: 0.1 THOU/MM3 (ref 0–0.4)
EPITHELIAL CELLS, UA: ABNORMAL /HPF
GFR SERPL CREATININE-BSD FRML MDRD: > 90 ML/MIN/1.73M2
GLUCOSE BLD-MCNC: 107 MG/DL (ref 70–108)
GLUCOSE, URINE: NEGATIVE MG/DL
HCT VFR BLD CALC: 40.3 % (ref 37–47)
HEMOGLOBIN: 13.8 GM/DL (ref 12–16)
ICTOTEST: NEGATIVE
KETONES, URINE: NEGATIVE
LEUKOCYTE ESTERASE, URINE: ABNORMAL
LIPASE: 68.8 U/L (ref 5.6–51.3)
LYMPHOCYTES # BLD: 16.8 %
LYMPHOCYTES ABSOLUTE: 1.8 THOU/MM3 (ref 1–4.8)
MCH RBC QN AUTO: 29.3 PG (ref 27–31)
MCHC RBC AUTO-ENTMCNC: 34.3 GM/DL (ref 33–37)
MCV RBC AUTO: 85.5 FL (ref 81–99)
MISCELLANEOUS LAB TEST RESULT: ABNORMAL
MONOCYTES # BLD: 7.3 %
MONOCYTES ABSOLUTE: 0.8 THOU/MM3 (ref 0.4–1.3)
NITRITE, URINE: NEGATIVE
NUCLEATED RED BLOOD CELLS: 0 /100 WBC
OSMOLALITY CALCULATION: 275.6 MOSMOL/KG (ref 275–300)
PDW BLD-RTO: 13.5 % (ref 11.5–14.5)
PH UA: 7.5
PLATELET # BLD: 206 THOU/MM3 (ref 130–400)
PMV BLD AUTO: 9 MCM (ref 7.4–10.4)
POTASSIUM SERPL-SCNC: 4.1 MEQ/L (ref 3.5–5.2)
PREGNANCY, URINE: POSITIVE
PROTEIN UA: NEGATIVE MG/DL
RBC # BLD: 4.72 MILL/MM3 (ref 4.2–5.4)
RBC URINE: ABNORMAL /HPF
RENAL EPITHELIAL, UA: ABNORMAL
SEG NEUTROPHILS: 74.7 %
SEGMENTED NEUTROPHILS ABSOLUTE COUNT: 8.2 THOU/MM3 (ref 1.8–7.7)
SODIUM BLD-SCNC: 139 MEQ/L (ref 135–145)
SPECIFIC GRAVITY UA: 1.02 (ref 1–1.03)
TOTAL PROTEIN: 7.1 G/DL (ref 6.1–8)
TROPONIN T: < 0.01 NG/ML
UROBILINOGEN, URINE: 1 EU/DL
WBC # BLD: 11 THOU/MM3 (ref 4.8–10.8)
WBC UA: ABNORMAL /HPF
YEAST: ABNORMAL

## 2017-12-21 PROCEDURE — 81001 URINALYSIS AUTO W/SCOPE: CPT

## 2017-12-21 PROCEDURE — 6370000000 HC RX 637 (ALT 250 FOR IP): Performed by: EMERGENCY MEDICINE

## 2017-12-21 PROCEDURE — 99284 EMERGENCY DEPT VISIT MOD MDM: CPT

## 2017-12-21 PROCEDURE — 36415 COLL VENOUS BLD VENIPUNCTURE: CPT

## 2017-12-21 PROCEDURE — 76705 ECHO EXAM OF ABDOMEN: CPT

## 2017-12-21 PROCEDURE — 81025 URINE PREGNANCY TEST: CPT

## 2017-12-21 PROCEDURE — 93005 ELECTROCARDIOGRAM TRACING: CPT

## 2017-12-21 PROCEDURE — 80053 COMPREHEN METABOLIC PANEL: CPT

## 2017-12-21 PROCEDURE — 84484 ASSAY OF TROPONIN QUANT: CPT

## 2017-12-21 PROCEDURE — 83690 ASSAY OF LIPASE: CPT

## 2017-12-21 PROCEDURE — 6360000002 HC RX W HCPCS: Performed by: EMERGENCY MEDICINE

## 2017-12-21 PROCEDURE — 85025 COMPLETE CBC W/AUTO DIFF WBC: CPT

## 2017-12-21 RX ORDER — FAMOTIDINE 20 MG/1
20 TABLET, FILM COATED ORAL ONCE
Status: COMPLETED | OUTPATIENT
Start: 2017-12-21 | End: 2017-12-21

## 2017-12-21 RX ORDER — PYRIDOXINE HCL (VITAMIN B6) 100 MG
100 TABLET ORAL NIGHTLY
Qty: 30 TABLET | Refills: 0 | Status: SHIPPED | OUTPATIENT
Start: 2017-12-21 | End: 2017-12-29

## 2017-12-21 RX ORDER — ONDANSETRON 4 MG/1
4 TABLET, ORALLY DISINTEGRATING ORAL ONCE
Status: COMPLETED | OUTPATIENT
Start: 2017-12-21 | End: 2017-12-21

## 2017-12-21 RX ADMIN — ONDANSETRON 4 MG: 4 TABLET, ORALLY DISINTEGRATING ORAL at 19:10

## 2017-12-21 RX ADMIN — FAMOTIDINE 20 MG: 20 TABLET, FILM COATED ORAL at 19:10

## 2017-12-21 ASSESSMENT — PAIN DESCRIPTION - LOCATION: LOCATION: ABDOMEN

## 2017-12-21 ASSESSMENT — PAIN SCALES - GENERAL: PAINLEVEL_OUTOF10: 6

## 2017-12-21 ASSESSMENT — PAIN DESCRIPTION - ORIENTATION: ORIENTATION: UPPER

## 2017-12-21 ASSESSMENT — PAIN DESCRIPTION - PAIN TYPE: TYPE: ACUTE PAIN

## 2017-12-21 NOTE — ED PROVIDER NOTES
of 2017 10:12 PM          ALLERGIES     has No Known Allergies. FAMILY HISTORY     indicated that her mother is alive. She indicated that her father is alive. She indicated that her maternal grandmother is alive. She indicated that her maternal grandfather is . She indicated that her paternal grandmother is . She indicated that her paternal grandfather is alive. She indicated that the status of her maternal aunt is unknown. She indicated that the status of her neg hx is unknown.    family history includes Alcohol Abuse in her maternal grandfather; Cancer in her maternal grandfather; Dementia in her maternal grandmother; No Known Problems in her father and mother; Rectal Cancer in her maternal aunt. SOCIAL HISTORY      reports that she has never smoked. She has never used smokeless tobacco. She reports that she drinks alcohol. She reports that she does not use drugs. PHYSICAL EXAM     INITIAL VITALS:  height is 5' 7\" (1.702 m) and weight is 259 lb (117.5 kg). Her oral temperature is 98.5 °F (36.9 °C). Her blood pressure is 142/88 (abnormal) and her pulse is 82. Her respiration is 18 and oxygen saturation is 100%. Physical Exam   Constitutional: She appears well-developed. HENT:   Head: Normocephalic and atraumatic. Eyes: EOM are normal.   Neck: Normal range of motion. Cardiovascular: Normal rate and regular rhythm. Exam reveals no gallop and no friction rub. No murmur heard. Pulmonary/Chest: Effort normal and breath sounds normal. She has no wheezes. Abdominal: Soft. She exhibits no distension. There is tenderness (Mild tenderness in the epigastrium). Musculoskeletal: Normal range of motion. She exhibits no tenderness. Neurological: She is alert. Skin: Skin is warm. Nursing note and vitals reviewed.       DIFFERENTIAL DIAGNOSIS:       DIAGNOSTIC RESULTS     EKG: All EKG's are interpreted by the Emergency Department Physician who either signs or Co-signs this chart in the absence of a cardiologist.    NSR    Vent. Rate: 77 bpm  NC interval: 128 ms  QRS duration: 86 ms  QTc: 445 ms  P-R-T axes: 6, 66, 37          RADIOLOGY: non-plain film images(s) such as CT, Ultrasound and MRI are read by the radiologist.    US LIVER   Final Result   1. At least mild hepatic steatosis. 2.  No focal liver lesions. 3.  No bile duct abnormalities. 4.  The gallbladder surgically absent. 5.  No ascites. **This report has been created using voice recognition software. It may contain minor errors which are inherent in voice recognition technology. **      Final report electronically signed by Dr. Mandy Mendez on 12/21/2017 9:34 PM          LABS:   Labs Reviewed   URINALYSIS WITH MICROSCOPIC - Abnormal; Notable for the following:        Result Value    Bilirubin Urine SMALL (*)     Leukocyte Esterase, Urine MODERATE (*)     Character, Urine CLOUDY (*)     All other components within normal limits   CBC WITH AUTO DIFFERENTIAL - Abnormal; Notable for the following:     WBC 11.0 (*)     Segs Absolute 8.2 (*)     All other components within normal limits   COMPREHENSIVE METABOLIC PANEL - Abnormal; Notable for the following:     BUN 6 (*)     CO2 22 (*)      (*)      (*)     All other components within normal limits   LIPASE - Abnormal; Notable for the following:     Lipase 68.8 (*)     All other components within normal limits   PREGNANCY, URINE   TROPONIN   ICTOTEST, URINE   ANION GAP   GLOMERULAR FILTRATION RATE, ESTIMATED   OSMOLALITY       EMERGENCY DEPARTMENT COURSE:   Vitals:    Vitals:    12/21/17 1820 12/21/17 1924 12/21/17 2113   BP: (!) 150/80 137/78 (!) 142/88   Pulse: 78 70 82   Resp:  18 18   Temp: 98.5 °F (36.9 °C)     TempSrc: Oral     SpO2: 98% 98% 100%   Weight: 259 lb (117.5 kg)     Height: 5' 7\" (1.702 m)       6:44 PM: The patient was seen and evaluated. 25-year-old female with abdominal pain nausea vomit, see HPI above.  Vital signs and

## 2017-12-22 ENCOUNTER — TELEPHONE (OUTPATIENT)
Dept: FAMILY MEDICINE CLINIC | Age: 22
End: 2017-12-22

## 2017-12-22 DIAGNOSIS — R74.8 ABNORMAL TRANSAMINASES: Primary | ICD-10-CM

## 2017-12-22 DIAGNOSIS — K75.81 NASH (NONALCOHOLIC STEATOHEPATITIS): ICD-10-CM

## 2017-12-22 NOTE — TELEPHONE ENCOUNTER
Reviewed her ER record  I'd like to get some f/u labs and see her in the office before we refer. That way part of the w/u is done and she may not have to see GI based on what we find. Especially with being pregnant, they may not have much else to offer. I've ordered the labs. Fasting    ASSESSMENT & PLAN  1. Abnormal transaminases    - Amylase; Future  - Lipase; Future  - Hepatitis Panel, Acute; Future  - Hepatic Function Panel; Future  - JAVAD Screen with Reflex; Future  - Anti-smooth muscle antibody; Future  - Ceruloplasmin; Future  - Ferritin; Future  - Iron; Future  - Iron Binding Capacity; Future    2. RM (nonalcoholic steatohepatitis)    - Amylase; Future  - Lipase; Future  - Hepatitis Panel, Acute; Future  - Hepatic Function Panel; Future  - JAVAD Screen with Reflex; Future  - Anti-smooth muscle antibody; Future  - Ceruloplasmin; Future  - Ferritin; Future  - Iron; Future  - Iron Binding Capacity;  Future

## 2017-12-22 NOTE — TELEPHONE ENCOUNTER
Pt informed. She would like the lab order faxed to [phone] at fax [phone]. Order faxed. No open slots until Jan. 5.   Only freeze/thaw available. Ok to use?

## 2017-12-27 LAB
ALBUMIN SERPL-MCNC: 4.3 G/DL (ref 3.2–5.3)
ALK PHOSPHATASE: 78 IU/L (ref 35–121)
ALT SERPL-CCNC: 120 IU/L (ref 5–59)
AMYLASE: 39 IU/L (ref 35–120)
ANTI-NUCLEAR ANTIBODY (ANA): NORMAL
AST SERPL-CCNC: 42 IU/L (ref 10–42)
BILIRUB SERPL-MCNC: 0.7 MG/DL (ref 0.2–1.3)
BILIRUBIN DIRECT: 0.2 MG/DL (ref 0–0.2)
FERRITIN: 60 NG/ML (ref 10–291)
HAV IGM SER IA-ACNC: NEGATIVE
HEPATITIS B CORE IGM ANTIBODY: NEGATIVE
HEPATITIS B SURFACE ANTIGEN: NEGATIVE
HEPATITIS C ANTIBODY: NEGATIVE
LIPASE: 39 IU/L (ref 11–82)
TOTAL PROTEIN: 7.2 G/DL (ref 5.8–8)

## 2017-12-28 LAB
CERULOPLASMIN: 30.9 MG/DL (ref 16–45)
F-ACTIN AB IGG: 5.7 UNITS
IRON SATURATION: 29 % (ref 20–50)
IRON, SERUM: 101 UG/DL (ref 37–145)
SMOOTH MUSCLE AB IGG TITER: NORMAL TITER
TOTAL IRON BINDING CAPACITY: 348 UG/DL (ref 250–450)
UNSATURATED IRON BINDING CAPACITY: 247 UG/DL (ref 112–347)

## 2017-12-29 ENCOUNTER — OFFICE VISIT (OUTPATIENT)
Dept: FAMILY MEDICINE CLINIC | Age: 22
End: 2017-12-29
Payer: COMMERCIAL

## 2017-12-29 VITALS
HEART RATE: 84 BPM | SYSTOLIC BLOOD PRESSURE: 130 MMHG | HEIGHT: 66 IN | DIASTOLIC BLOOD PRESSURE: 78 MMHG | RESPIRATION RATE: 12 BRPM | TEMPERATURE: 97.7 F | BODY MASS INDEX: 41.14 KG/M2 | WEIGHT: 256 LBS

## 2017-12-29 DIAGNOSIS — K75.81 NASH (NONALCOHOLIC STEATOHEPATITIS): Primary | ICD-10-CM

## 2017-12-29 DIAGNOSIS — R74.8 ABNORMAL TRANSAMINASES: ICD-10-CM

## 2017-12-29 DIAGNOSIS — E66.01 MORBID OBESITY WITH BMI OF 40.0-44.9, ADULT (HCC): Chronic | ICD-10-CM

## 2017-12-29 DIAGNOSIS — Z3A.01 LESS THAN 8 WEEKS GESTATION OF PREGNANCY: ICD-10-CM

## 2017-12-29 PROCEDURE — 99214 OFFICE O/P EST MOD 30 MIN: CPT | Performed by: FAMILY MEDICINE

## 2017-12-29 RX ORDER — FOLIC ACID 1 MG/1
1 TABLET ORAL DAILY
COMMUNITY
End: 2020-06-11

## 2017-12-29 NOTE — PATIENT INSTRUCTIONS
breathing. ? · You vomit blood or what looks like coffee grounds. ?Call your doctor now or seek immediate medical care if:  ? · You feel very sleepy or confused. ? · You have new or worse belly pain. ? · You have a fever. ? · There is a new or increasing yellow tint to your skin or the whites of your eyes. ? · You have any abnormal bleeding, such as:  ¨ Nosebleeds. ¨ Vaginal bleeding that is different (heavier, more frequent, at a different time of the month) than what you are used to. ¨ Bloody or black stools, or rectal bleeding. ¨ Bloody or pink urine. ? Watch closely for changes in your health, and be sure to contact your doctor if:  ? · Your belly is getting bigger. ? · You are gaining weight. ? · You have any problems. Where can you learn more? Go to https://TrakpeMerchMeeb.[employer]. org and sign in to your appAtt[employer] account. Enter R248 in the [employer] box to learn more about \"Nonalcoholic Steatohepatitis (RM): Care Instructions. \"     If you do not have an account, please click on the \"Sign Up Now\" link. Current as of: May 12, 2017  Content Version: 11.4  © 3031-7432 Healthwise, Incorporated. Care instructions adapted under license by [facility] ([facility]). If you have questions about a medical condition or this instruction, always ask your healthcare professional. [place] [address] any warranty or liability for your use of this information.

## 2017-12-29 NOTE — PROGRESS NOTES
Chief Complaint   Patient presents with    Follow-up     Liver labs    Obesity    Other     pregnant       History obtained from the patient. SUBJECTIVE:  Cristine Garcia is a 20 y.o. female that presents today for       -01. Abd pain/elevated transaminases: of note, pt about 8 wks pregnant, has upcoming OB apt. seen in ER 12/21 for RUQ abd pain. No longer has gall bladder. In ER had labs and RUQ US done. Labs showed elevated AST/ALT. US showed mild steatosis. Told to get GI consult. Instead we did f/u labs and f/u apt. Pt feeling better. Pain gone. Still gets occ upper abd pain. No ETOH use. No fam hx of liver dz. Here to f/u lab wokr      -02. Morbid obesity: working on wt loss, but recently found to be pregnant. Good diet changes, is trying to exercise      -03. About 8 wks pregant. Will be following with Dr. Umer Morgan. No FM. No VB or cramping. No RUQ pain or jaundice. Had gest HTN last preg. No pre-e, HELLP or acute fatty liver of pregnancy.        Age/Gender Health Maintenance    Lipid -   Lab Results   Component Value Date    CHOL 209 (H) 11/02/2017     Lab Results   Component Value Date    TRIG 139 11/02/2017     Lab Results   Component Value Date    HDL 46 11/02/2017     Lab Results   Component Value Date    LDLCALC 135 (H) 11/02/2017     Lab Results   Component Value Date    LABVLDL 28 11/02/2017     Lab Results   Component Value Date    CHOLHDLRATIO 4.5 11/02/2017         DM Screen -   Lab Results   Component Value Date    GLUCOSE 107 12/21/2017    GLUCOSE 91 11/02/2017       Colon Cancer Screening - age 48  Lung Cancer Screening (Age 54 to [de-identified] with 27 pack year hx, current smoker or quit within past 15 years) - n/a    Tetanus - pt will check her records  Influenza Vaccine - declines NOV 2017  Pneumonia Vaccine - age 72  Zostavax - age 60     Breast Cancer Screening - age 44-55  Cervical Cancer Screening - records pending, Dr. Umer Morgan  Osteoporosis Screening - age 71    Falls screening - age Family History   Problem Relation Age of Onset    No Known Problems Mother     No Known Problems Father     Dementia Maternal Grandmother     Cancer Maternal Grandfather      Unclear type    Alcohol Abuse Maternal Grandfather     Rectal Cancer Maternal Aunt     Colon Cancer Neg Hx     Breast Cancer Neg Hx          I have reviewed the patient's past medical history, past surgical history, allergies, medications, social and family history and I have made updates where appropriate. Review of Systems  Positive responses are highlighted in bold    Constitutional:  Fever, Chills, Night Sweats, Fatigue, Unexpected changes in weight  HENT:  Ear pain, Tinnitus, Nosebleeds, Trouble swallowing, Hearing loss, Sore throat  Cardiovascular:  Chest Pain, Palpitations, Orthopnea, Paroxysmal Nocturnal Dyspnea  Respiratory:  Cough, Wheezing, Shortness of breath, Chest tightness, Apnea  Gastrointestinal:  Nausea, Vomiting, Diarrhea, Constipation, Heartburn, Blood in stool  Genitourinary:  Difficulty or painful urination, Flank pain, Change in frequency, Urgency  Skin:  Color change, Rash, Itching, Wound  Musculoskeletal:  Joint pain, Back pain, Gait problems, Joint swelling, Myalgias  Neurological:  Dizziness, Headaches, Presyncope, Numbness, Seizures, Tremors  Endocrine:  Heat Intolerance, Cold Intolerance, Polydipsia, Polyphagia, Polyuria      PHYSICAL EXAM:  Vitals:    12/29/17 1200   BP: 130/78   Pulse: 84   Resp: 12   Temp: 97.7 °F (36.5 °C)   TempSrc: Oral   Weight: 256 lb (116.1 kg)   Height: 5' 5.75\" (1.67 m)     Body mass index is 41.64 kg/m².   Pain Score:   0 - No pain    VS Reviewed  General Appearance: A&O x 3, No acute distress,well developed and well- nourished  Eyes: pupils equal, round, and reactive to light, extraocular eye movements intact, conjunctivae and eye lids without erythema  ENT: external ear and ear canal clear bilaterally, TMs intact and regular, nose without deformity, nasal mucosa and turbinates normal without polyps, oropharynx normal, dentition is normal for age  Neck: supple and non-tender without mass, no thyromegaly or thyroid nodules, no cervical lymphadenopathy  Pulmonary/Chest: clear to auscultation bilaterally- no wheezes, rales or rhonchi, normal air movement, no respiratory distress or retractions  Cardiovascular: S1 and S2 auscultated w/ RRR. No murmurs, rubs, clicks, or gallops, distal pulses intact. Abdomen: soft, non-tender, non-distended, bowl sounds physiologic,  no rebound or guarding, no masses or hernias noted. Liver and spleen without enlargement. Extremities: no cyanosis, clubbing or edema of the lower extremities.    Skin: warm and dry, no rash or erythema      Component      Latest Ref Rng & Units 12/26/2017   Bilirubin      0.2 - 1.3 mg/dl 0.7   Bilirubin, Direct      0.0 - 0.2 mg/dl 0.2   Alk Phosphatase      35 - 121 IU/L 78   AST      10 - 42 IU/L 42   ALT      5 - 59 IU/L 120 (H)   Total Protein      5.8 - 8.0 g/dl 7.2   Albumin      3.2 - 5.3 g/dl 4.3   Hep A IgM      NEGATIVE NEGATIVE   Hep B Core Ab, IgM      NEGATIVE NEGATIVE   Hepatitis B Surface Ag      NEGATIVE NEGATIVE   Hepatitis C Ab      NEGATIVE NEGATIVE   Iron, Serum      37 - 145 UG/   UIBC      112 - 347 UG/   TIBC      250 - 450 UG/   Iron Saturation      20 - 50 % 29   F-Actin IgG      SEE BELOW UNITS 5.7   Smooth Muscle Ab      <1:20 TITER NOT INDICATED   Amylase      35 - 120 IU/L 39   Ferritin      10 - 291 ng/ml 60.0   Lipase      11 - 82 IU/L 39   Ceruloplasmin      16.0 - 45.0 MG/DL 30.9   JAVAD      NEGATIVE A NEGATIVE AT 1:80       Admission on 12/21/2017, Discharged on 12/21/2017   Component Date Value Ref Range Status    Glucose, Urine 12/21/2017 NEGATIVE  NEGATIVE mg/dl Final    Bilirubin Urine 12/21/2017 SMALL* NEGATIVE Final    Ketones, Urine 12/21/2017 NEGATIVE  NEGATIVE Final    Specific Gravity, UA 12/21/2017 1.019  1.002 - 1.03 Final    Blood, Urine 12/21/2017 NEGATIVE  NEGATIVE Final    pH, UA 12/21/2017 7.5  5.0 - 9.0 Final    Protein, UA 12/21/2017 NEGATIVE  NEGATIVE mg/dl Final    Urobilinogen, Urine 12/21/2017 1.0  0.0 - 1.0 eu/dl Final    Nitrite, Urine 12/21/2017 NEGATIVE  NEGATIVE Final    Leukocyte Esterase, Urine 12/21/2017 MODERATE* NEGATIVE Final    Color, UA 12/21/2017 DK YELLOW  YELLOW-STR Final    Character, Urine 12/21/2017 CLOUDY* CLR-SL.CORAL Final    RBC, UA 12/21/2017 NONE  0-2/hpf /hpf Final    WBC, UA 12/21/2017 5-10  0-4/hpf /hpf Final    Epi Cells 12/21/2017 15-25  3-5/hpf /hpf Final    Bacteria, UA 12/21/2017 FEW  FEW/NONE S Final    Casts 12/21/2017 NONE SEEN  NONE SEEN /lpf Final    Crystals 12/21/2017 NONE SEEN  NONE SEEN Final    Renal Epithelial, Urine 12/21/2017 NONE SEEN  NONE SEEN Final    Yeast, UA 12/21/2017 NONE SEEN  NONE SEEN Final    Casts 12/21/2017 NONE SEEN  /lpf Final    Miscellaneous Lab Test Result 12/21/2017 NONE SEEN   Final    Pregnancy, Urine 12/21/2017 POSITIVE  NEGATIVE Final    Ventricular Rate 12/21/2017 77  BPM Final    Atrial Rate 12/21/2017 77  BPM Final    P-R Interval 12/21/2017 128  ms Final    QRS Duration 12/21/2017 86  ms Final    Q-T Interval 12/21/2017 394  ms Final    QTc Calculation (Bazett) 12/21/2017 445  ms Final    P Axis 12/21/2017 6  degrees Final    R Axis 12/21/2017 66  degrees Final    T Axis 12/21/2017 37  degrees Final    WBC 12/21/2017 11.0* 4.8 - 10.8 thou/mm3 Final    RBC 12/21/2017 4.72  4.20 - 5.40 mill/mm3 Final    Hemoglobin 12/21/2017 13.8  12.0 - 16.0 gm/dl Final    Hematocrit 12/21/2017 40.3  37.0 - 47.0 % Final    MCV 12/21/2017 85.5  81.0 - 99.0 fL Final    MCH 12/21/2017 29.3  27.0 - 31.0 pg Final    MCHC 12/21/2017 34.3  33.0 - 37.0 gm/dl Final    RDW 12/21/2017 13.5  11.5 - 14.5 % Final    Platelets 78/68/2465 206  130 - 400 thou/mm3 Final    MPV 12/21/2017 9.0  7.4 - 10.4 mcm Final    Seg Neutrophils 12/21/2017 74.7  % Final    Lymphocytes There are no liver masses. There is no intrahepatic biliary ductal dilatation.       There is normal direction of color flow and spectral analysis in the main portal vein, right and left portal veins, hepatic artery, inferior vena cava and all 3 hepatic veins.       The pancreatic head and body are within normal limits. The tail is not well seen due to overlying bowel gas.       The gallbladder is surgically absent.       The common bile duct is normal and measures 5 mm.        There is no hydronephrosis in the visualized aspects of the right kidney.           Impression   1.  At least mild hepatic steatosis. 2.  No focal liver lesions. 3.  No bile duct abnormalities. 4.  The gallbladder surgically absent. 5.  No ascites.                       **This report has been created using voice recognition software. It may contain minor errors which are inherent in voice recognition technology. **       Final report electronically signed by Dr. Tere Granger on 12/21/2017 9:34 PM         ASSESSMENT & PLAN  1. RM (nonalcoholic steatohepatitis)    Not sure what to make of the abd pain, but that's pretty much gone. No acute findings on US. Labs ok and improving. Neg exam today. I'm doubtful liver enzymes findings related her her abd pain, but can't say for sure    At this point, lab w/u neg for underlying liver problems is negative, including viral, hemachromatosis, wilsons dz or autoimmune issues. US neg for mass  Alk phos and bili never elevated    At this point likely has mild RM    Plan:  Wt loss even though pregnant  Repeat LFT in 4 wks, to trend. If normal, will monitor periodically  If high yet, would consider GI con at that point  Lab results printed as well as US and pt will take to her OB apt  Will also fax this note  F/u labs by phone    - Hepatic Function Panel; Future    2. Abnormal transaminases    As above    - Hepatic Function Panel; Future    3.  Morbid obesity with BMI of 40.0-44.9, adult

## 2018-01-31 ENCOUNTER — TELEPHONE (OUTPATIENT)
Dept: FAMILY MEDICINE CLINIC | Age: 23
End: 2018-01-31

## 2018-01-31 LAB
ALBUMIN SERPL-MCNC: 3.9 G/DL (ref 3.2–5.3)
ALK PHOSPHATASE: 70 IU/L (ref 35–121)
ALT SERPL-CCNC: 22 IU/L (ref 5–59)
AST SERPL-CCNC: 18 IU/L (ref 10–42)
BILIRUB SERPL-MCNC: 0.4 MG/DL (ref 0.2–1.3)
BILIRUBIN DIRECT: 0.1 MG/DL (ref 0–0.2)
TOTAL PROTEIN: 6.7 G/DL (ref 5.8–8)

## 2018-03-22 ENCOUNTER — OFFICE VISIT (OUTPATIENT)
Dept: FAMILY MEDICINE CLINIC | Age: 23
End: 2018-03-22
Payer: COMMERCIAL

## 2018-03-22 VITALS
RESPIRATION RATE: 16 BRPM | SYSTOLIC BLOOD PRESSURE: 124 MMHG | HEIGHT: 65 IN | HEART RATE: 89 BPM | TEMPERATURE: 98.9 F | DIASTOLIC BLOOD PRESSURE: 62 MMHG | WEIGHT: 250 LBS | BODY MASS INDEX: 41.65 KG/M2

## 2018-03-22 DIAGNOSIS — K75.81 NASH (NONALCOHOLIC STEATOHEPATITIS): ICD-10-CM

## 2018-03-22 DIAGNOSIS — Z33.1: ICD-10-CM

## 2018-03-22 DIAGNOSIS — K21.9 GASTROESOPHAGEAL REFLUX DISEASE, ESOPHAGITIS PRESENCE NOT SPECIFIED: ICD-10-CM

## 2018-03-22 DIAGNOSIS — L21.9 SEBORRHEIC DERMATITIS: ICD-10-CM

## 2018-03-22 DIAGNOSIS — D22.39 MELANOCYTIC NEVUS OF FACE, OTHER LOCATION: Primary | ICD-10-CM

## 2018-03-22 PROCEDURE — 99214 OFFICE O/P EST MOD 30 MIN: CPT | Performed by: FAMILY MEDICINE

## 2018-03-22 RX ORDER — RANITIDINE 150 MG/1
150 TABLET ORAL 2 TIMES DAILY
Qty: 60 TABLET | Refills: 5 | Status: SHIPPED | OUTPATIENT
Start: 2018-03-22 | End: 2018-09-15 | Stop reason: SDUPTHER

## 2018-05-01 ENCOUNTER — OFFICE VISIT (OUTPATIENT)
Dept: CARDIOLOGY CLINIC | Age: 23
End: 2018-05-01
Payer: COMMERCIAL

## 2018-05-01 VITALS
HEART RATE: 108 BPM | HEIGHT: 67 IN | WEIGHT: 240 LBS | DIASTOLIC BLOOD PRESSURE: 90 MMHG | BODY MASS INDEX: 37.67 KG/M2 | SYSTOLIC BLOOD PRESSURE: 158 MMHG

## 2018-05-01 DIAGNOSIS — R07.89 OTHER CHEST PAIN: Primary | ICD-10-CM

## 2018-05-01 DIAGNOSIS — K21.9 GASTROESOPHAGEAL REFLUX DISEASE, ESOPHAGITIS PRESENCE NOT SPECIFIED: ICD-10-CM

## 2018-05-01 PROCEDURE — 99213 OFFICE O/P EST LOW 20 MIN: CPT | Performed by: INTERNAL MEDICINE

## 2018-05-01 ASSESSMENT — ENCOUNTER SYMPTOMS
ABDOMINAL DISTENTION: 0
ABDOMINAL PAIN: 0
EYE DISCHARGE: 0
SHORTNESS OF BREATH: 0
APNEA: 0
EYE REDNESS: 0
SORE THROAT: 0
DIARRHEA: 0
BLOOD IN STOOL: 0
NAUSEA: 0
CONSTIPATION: 0
SINUS PRESSURE: 0
CHEST TIGHTNESS: 0
EYE ITCHING: 0
COUGH: 0
BACK PAIN: 0
EYE PAIN: 0

## 2018-05-22 NOTE — PROCEDURES
[facility]     EGD/EUS Report    Patient: Bhargav Cao  : 1995  Acct#: [de-identified]     BRIEF HISTORY AND INDICATIONS:    The patient is a 24 y.o.,  female with significant past medical history of nausea and vomiting episodic, weight loss of approximately 20 pounds and epigastric/ RUQ pain. She had one episode of elevated LFT's in December. She was seen by Estephania Nails of cardiology because she was complaining of non-specific chest discomfort followed by nausea and vomiting. She had a normal stress test and echocardiogram.  She's ~ 29 wks pregnant. Dr. Hurt is the Ob/Gyn who referred her. She is s/p cholecystectomy last year with Dr. Adrian Matias. Because patient is pregnant, Rozetta Schilder elected to do an EUS and EGD to evaluate for retained stones in the common bile duct. PREMEDICATION:   TIVA anesthesia was used, see Anesthesia note for details. INSTRUMENT:  Olympus GIF H-180 gastroscope    The risks and benefits of upper endoscopy with biopsy and dilation were  described to the patient, including but not limited to bleeding, infection, poking a hole someplace requiring surgery to fix it, having reaction to medication, and death. The patient understood these risks and provided informed consent. The patient was placed in the left lateral decubitus position. Conscious sedation was administered . The patient was continuously monitored to ensure adequate sedation and patient safety. A forward-viewing Olympus endoscope was lubricated and inserted through the mouth into the oropharynx. Under direct visualization, the upper esophagus was intubated. The scope was advanced to the esophagus and stomach to second portion of duodenum. Scope was slowly withdrawn with good views of mucosal surfaces. The scope was retroflexed in the fundus. Findings and maneuvers are listed in impression below. The patient tolerated the procedure well. The scope was removed.  The patient was

## 2018-05-22 NOTE — H&P
[facility]  Sedation/Analgesia History & Physical    Patient: Bhargav Cao : 1995  Med Rec#: [id number] Acc#: [id number]   Provider Performing Procedure: Anabella Marrufo MD  Primary Care Physician: La Calderon DO    PRE-PROCEDURE   Brief History/Pre-Procedure Diagnosis:The patient is a 24 y.o.,  female with significant past medical history of nausea and vomiting, weight loss and epigastric pain. She was seen by Estephania Nails of cardiology because she was complaining of non-specific chest discomfort followed by nausea and vomiting. She had a normal stress test and echocardiogram.  She's ~ 29 wks pregnant. MEDICAL HISTORY  []CAD/Valve  []Liver Disease  []Lung Disease []Diabetes  []Hypertension []Renal Disease  []Additional information:       has a past medical history of Gestational hypertension; Morbid obesity with BMI of 40.0-44.9, adult ([facility] [address] S/P cholecystectomy; and Trauma. SURGICAL HISTORY   has a past surgical history that includes Cholecystectomy, laparoscopic (N/A, 10/13/2017). Additional information:       ALLERGIES   Allergies as of 2018    (No Known Allergies)     Additional information:       MEDICATIONS       Current Facility-Administered Medications:     0.45 % sodium chloride infusion, , Intravenous, Continuous, Hans Haile MD, Last Rate: 75 mL/hr at 18 8118  Prior to Admission medications    Medication Sig Start Date End Date Taking?  Authorizing Provider   nitrofurantoin, macrocrystal-monohydrate, (MACROBID) 100 MG capsule Take 100 mg by mouth 2 times daily   Yes Historical Provider, MD   ranitidine (ZANTAC) 150 MG tablet Take 1 tablet by mouth 2 times daily 3/22/18  Yes La Calderon DO   folic acid (FOLVITE) 1 MG tablet Take 1 mg by mouth daily   Yes Historical Provider, MD   Pediatric Multiple Vit-C-FA (FLINSTONES GUMMIES OMEGA-3 DHA PO) Take by mouth   Yes Historical Provider, MD     Additional information:       PHYSICAL: documentation record)  [x]Formulation and discussion of sedation/procedure plan, risks, and expectations with patient and/or responsible adult completed. [x]Patient examined immediately prior to the procedure.  (Refer to nursing sedation/analgesia documentation record)    Ana M Lamb MD   Electronically signed 5/23/2018 at 9:40 AM

## 2018-05-23 ENCOUNTER — HOSPITAL ENCOUNTER (OUTPATIENT)
Age: 23
Setting detail: OUTPATIENT SURGERY
Discharge: HOME | End: 2018-05-23
Attending: INTERNAL MEDICINE | Admitting: INTERNAL MEDICINE
Payer: COMMERCIAL

## 2018-05-23 ENCOUNTER — ANESTHESIA EVENT (OUTPATIENT)
Dept: ENDOSCOPY | Age: 23
End: 2018-05-23
Payer: COMMERCIAL

## 2018-05-23 ENCOUNTER — ANESTHESIA (OUTPATIENT)
Dept: ENDOSCOPY | Age: 23
End: 2018-05-23
Payer: COMMERCIAL

## 2018-05-23 VITALS
SYSTOLIC BLOOD PRESSURE: 97 MMHG | DIASTOLIC BLOOD PRESSURE: 52 MMHG | BODY MASS INDEX: 39.05 KG/M2 | RESPIRATION RATE: 18 BRPM | OXYGEN SATURATION: 100 % | HEART RATE: 75 BPM | HEIGHT: 67 IN | WEIGHT: 248.8 LBS | TEMPERATURE: 98.5 F

## 2018-05-23 VITALS
SYSTOLIC BLOOD PRESSURE: 115 MMHG | DIASTOLIC BLOOD PRESSURE: 55 MMHG | RESPIRATION RATE: 15 BRPM | OXYGEN SATURATION: 98 %

## 2018-05-23 PROCEDURE — 2580000003 HC RX 258: Performed by: INTERNAL MEDICINE

## 2018-05-23 PROCEDURE — 3700000000 HC ANESTHESIA ATTENDED CARE: Performed by: INTERNAL MEDICINE

## 2018-05-23 PROCEDURE — 7100000001 HC PACU RECOVERY - ADDTL 15 MIN: Performed by: INTERNAL MEDICINE

## 2018-05-23 PROCEDURE — 3609018700 HC ENDOSCOPIC ULTRASOUND: Performed by: INTERNAL MEDICINE

## 2018-05-23 PROCEDURE — 3700000001 HC ADD 15 MINUTES (ANESTHESIA): Performed by: INTERNAL MEDICINE

## 2018-05-23 PROCEDURE — 3609017100 HC EGD: Performed by: INTERNAL MEDICINE

## 2018-05-23 PROCEDURE — 2720000010 HC SURG SUPPLY STERILE: Performed by: INTERNAL MEDICINE

## 2018-05-23 PROCEDURE — 6360000002 HC RX W HCPCS: Performed by: SPECIALIST

## 2018-05-23 PROCEDURE — 7100000000 HC PACU RECOVERY - FIRST 15 MIN: Performed by: INTERNAL MEDICINE

## 2018-05-23 RX ORDER — SODIUM CHLORIDE 450 MG/100ML
INJECTION, SOLUTION INTRAVENOUS CONTINUOUS
Status: DISCONTINUED | OUTPATIENT
Start: 2018-05-23 | End: 2018-05-23 | Stop reason: HOSPADM

## 2018-05-23 RX ORDER — NITROFURANTOIN 25; 75 MG/1; MG/1
100 CAPSULE ORAL 2 TIMES DAILY
Status: ON HOLD | COMMUNITY
End: 2018-07-23 | Stop reason: ALTCHOICE

## 2018-05-23 RX ADMIN — SODIUM CHLORIDE: 4.5 INJECTION, SOLUTION INTRAVENOUS at 08:29

## 2018-05-23 RX ADMIN — PROPOFOL 100 MG: 10 INJECTION, EMULSION INTRAVENOUS at 10:10

## 2018-05-23 RX ADMIN — PROPOFOL 100 MG: 10 INJECTION, EMULSION INTRAVENOUS at 10:05

## 2018-05-23 RX ADMIN — PROPOFOL 100 MG: 10 INJECTION, EMULSION INTRAVENOUS at 10:01

## 2018-05-23 RX ADMIN — PROPOFOL 200 MG: 10 INJECTION, EMULSION INTRAVENOUS at 09:55

## 2018-05-23 RX ADMIN — PROPOFOL 70 MG: 10 INJECTION, EMULSION INTRAVENOUS at 10:13

## 2018-05-23 ASSESSMENT — ENCOUNTER SYMPTOMS
DYSPNEA ACTIVITY LEVEL: AFTER AMBULATING 1 FLIGHT OF STAIRS
SHORTNESS OF BREATH: 1

## 2018-05-23 ASSESSMENT — PAIN - FUNCTIONAL ASSESSMENT: PAIN_FUNCTIONAL_ASSESSMENT: 0-10

## 2018-05-23 ASSESSMENT — PAIN SCALES - GENERAL: PAINLEVEL_OUTOF10: 0

## 2018-05-23 NOTE — PROGRESS NOTES
Patient awake. Dr Mariah Shin reviewed findings with patient and family. Fetal monitoring completed. Discharge instructions given and explained.

## 2018-05-23 NOTE — FLOWSHEET NOTE
Pt in endo for scheduled procedure. , 29+4wks. Orders for NST prior to procedure. PT aware of monitoring and reasoning for it. Denies ctx, bleeding, leaking of fluid from vagina and admits fetal movement. Abdomen soft and nontender when fetal monitors applied. Denies any needs at this time.

## 2018-06-08 NOTE — PROGRESS NOTES
[address] [place] [address]                                  NON STRESS TEST    PATIENT NAME: Gifty Begum                    :        1995  MED REC NO:   [id number]                           ROOM:  ACCOUNT NO:   [de-identified]                           ADMIT DATE: 2018  PROVIDER:     Say Ybarra M.D.    DATE OF STUDY:  2018    This is a 79-year-old,  3, para 2, at approximately 29 weeks'  gestation for NST due to preoperative procedure. Fetal heart tones were in  the 120s, moderate variability, positive accelerations. TOCO none.         Cory Anaya M.D.    D: 2018 8:40:18       T: 2018 14:20:46     [id number]  Job#: [id number]     Doc#: [id number]    CC:

## 2018-06-12 NOTE — PROGRESS NOTES
[address] [place] [address]                                  NON STRESS TEST    PATIENT NAME: Antony Ca                    :        1995  MED REC NO:   [id number]                           ROOM:  ACCOUNT NO:   [de-identified]                           ADMIT DATE: 2018  PROVIDER:     Arie Nolasco M.D.    DATE OF STUDY:  2018    This is a 77-year-old,  3, para 2, at 29 weeks and 4 days, here for  EGD and endoscopy. Fetal heart tones were in the 120s, moderate  variability, and positive accelerations. TOCO none. She had a reactive  NST.         Arden Lechuga M.D.    D: 2018 8:52:19       T: 2018 11:23:26     BROOK  Job#: [id number]     Doc#: [id number]    CC:

## 2018-07-23 ENCOUNTER — ANESTHESIA (OUTPATIENT)
Dept: LABOR AND DELIVERY | Age: 23
End: 2018-07-23
Payer: COMMERCIAL

## 2018-07-23 ENCOUNTER — HOSPITAL ENCOUNTER (INPATIENT)
Age: 23
LOS: 2 days | Discharge: HOME | End: 2018-07-25
Attending: OBSTETRICS & GYNECOLOGY | Admitting: OBSTETRICS & GYNECOLOGY
Payer: COMMERCIAL

## 2018-07-23 ENCOUNTER — ANESTHESIA EVENT (OUTPATIENT)
Dept: LABOR AND DELIVERY | Age: 23
End: 2018-07-23
Payer: COMMERCIAL

## 2018-07-23 LAB
ABO: NORMAL
AMPHETAMINE+METHAMPHETAMINE URINE SCREEN: POSITIVE
ANTIBODY SCREEN: NORMAL
BARBITURATE QUANTITATIVE URINE: NEGATIVE
BENZODIAZEPINE QUANTITATIVE URINE: NEGATIVE
CANNABINOID QUANTITATIVE URINE: NEGATIVE
COCAINE METABOLITE QUANTITATIVE URINE: NEGATIVE
ERYTHROCYTE [DISTWIDTH] IN BLOOD BY AUTOMATED COUNT: 13.3 % (ref 11.5–14.5)
ERYTHROCYTE [DISTWIDTH] IN BLOOD BY AUTOMATED COUNT: 40.6 FL (ref 35–45)
HCT VFR BLD CALC: 36.3 % (ref 37–47)
HEMOGLOBIN: 11.9 GM/DL (ref 12–16)
MCH RBC QN AUTO: 27.6 PG (ref 26–33)
MCHC RBC AUTO-ENTMCNC: 32.8 GM/DL (ref 32.2–35.5)
MCV RBC AUTO: 84.2 FL (ref 81–99)
OPIATES, URINE: NEGATIVE
OXYCODONE: NEGATIVE
PHENCYCLIDINE QUANTITATIVE URINE: NEGATIVE
PLATELET # BLD: 201 THOU/MM3 (ref 130–400)
PMV BLD AUTO: 12.6 FL (ref 9.4–12.4)
RBC # BLD: 4.31 MILL/MM3 (ref 4.2–5.4)
RH FACTOR: NORMAL
RPR: NONREACTIVE
WBC # BLD: 11.8 THOU/MM3 (ref 4.8–10.8)

## 2018-07-23 PROCEDURE — 1220000001 HC SEMI PRIVATE L&D R&B

## 2018-07-23 PROCEDURE — 3E033VJ INTRODUCTION OF OTHER HORMONE INTO PERIPHERAL VEIN, PERCUTANEOUS APPROACH: ICD-10-PCS | Performed by: OBSTETRICS & GYNECOLOGY

## 2018-07-23 PROCEDURE — 80307 DRUG TEST PRSMV CHEM ANLYZR: CPT

## 2018-07-23 PROCEDURE — 2709999900 HC NON-CHARGEABLE SUPPLY

## 2018-07-23 PROCEDURE — 10907ZC DRAINAGE OF AMNIOTIC FLUID, THERAPEUTIC FROM PRODUCTS OF CONCEPTION, VIA NATURAL OR ARTIFICIAL OPENING: ICD-10-PCS | Performed by: OBSTETRICS & GYNECOLOGY

## 2018-07-23 PROCEDURE — 85027 COMPLETE CBC AUTOMATED: CPT

## 2018-07-23 PROCEDURE — 36415 COLL VENOUS BLD VENIPUNCTURE: CPT

## 2018-07-23 PROCEDURE — 6360000002 HC RX W HCPCS

## 2018-07-23 PROCEDURE — 86901 BLOOD TYPING SEROLOGIC RH(D): CPT

## 2018-07-23 PROCEDURE — 6360000002 HC RX W HCPCS: Performed by: ANESTHESIOLOGY

## 2018-07-23 PROCEDURE — 86592 SYPHILIS TEST NON-TREP QUAL: CPT

## 2018-07-23 PROCEDURE — 86850 RBC ANTIBODY SCREEN: CPT

## 2018-07-23 PROCEDURE — 2500000003 HC RX 250 WO HCPCS: Performed by: ANESTHESIOLOGY

## 2018-07-23 PROCEDURE — 2580000003 HC RX 258: Performed by: OBSTETRICS & GYNECOLOGY

## 2018-07-23 PROCEDURE — 86900 BLOOD TYPING SEROLOGIC ABO: CPT

## 2018-07-23 PROCEDURE — 3700000025 ANESTHESIA EPIDURAL BLOCK: Performed by: ANESTHESIOLOGY

## 2018-07-23 RX ORDER — LIDOCAINE HYDROCHLORIDE 10 MG/ML
30 INJECTION, SOLUTION EPIDURAL; INFILTRATION; INTRACAUDAL; PERINEURAL PRN
Status: DISCONTINUED | OUTPATIENT
Start: 2018-07-23 | End: 2018-07-24

## 2018-07-23 RX ORDER — DIPHENHYDRAMINE HYDROCHLORIDE 50 MG/ML
25 INJECTION INTRAMUSCULAR; INTRAVENOUS EVERY 6 HOURS PRN
Status: DISCONTINUED | OUTPATIENT
Start: 2018-07-23 | End: 2018-07-24

## 2018-07-23 RX ORDER — MISOPROSTOL 200 UG/1
1000 TABLET ORAL PRN
Status: DISCONTINUED | OUTPATIENT
Start: 2018-07-23 | End: 2018-07-24

## 2018-07-23 RX ORDER — CARBOPROST TROMETHAMINE 250 UG/ML
250 INJECTION, SOLUTION INTRAMUSCULAR PRN
Status: DISCONTINUED | OUTPATIENT
Start: 2018-07-23 | End: 2018-07-24

## 2018-07-23 RX ORDER — BUTORPHANOL TARTRATE 1 MG/ML
1 INJECTION, SOLUTION INTRAMUSCULAR; INTRAVENOUS
Status: DISCONTINUED | OUTPATIENT
Start: 2018-07-23 | End: 2018-07-24

## 2018-07-23 RX ORDER — TERBUTALINE SULFATE 1 MG/ML
0.25 INJECTION, SOLUTION SUBCUTANEOUS ONCE
Status: DISCONTINUED | OUTPATIENT
Start: 2018-07-23 | End: 2018-07-24

## 2018-07-23 RX ORDER — SODIUM CHLORIDE 0.9 % (FLUSH) 0.9 %
10 SYRINGE (ML) INJECTION PRN
Status: DISCONTINUED | OUTPATIENT
Start: 2018-07-23 | End: 2018-07-24

## 2018-07-23 RX ORDER — ONDANSETRON 2 MG/ML
8 INJECTION INTRAMUSCULAR; INTRAVENOUS EVERY 6 HOURS PRN
Status: DISCONTINUED | OUTPATIENT
Start: 2018-07-23 | End: 2018-07-24

## 2018-07-23 RX ORDER — SODIUM CHLORIDE 0.9 % (FLUSH) 0.9 %
10 SYRINGE (ML) INJECTION EVERY 12 HOURS SCHEDULED
Status: DISCONTINUED | OUTPATIENT
Start: 2018-07-23 | End: 2018-07-24

## 2018-07-23 RX ORDER — ACETAMINOPHEN 325 MG/1
650 TABLET ORAL EVERY 4 HOURS PRN
Status: DISCONTINUED | OUTPATIENT
Start: 2018-07-23 | End: 2018-07-24

## 2018-07-23 RX ORDER — DIPHENHYDRAMINE HYDROCHLORIDE 50 MG/ML
25 INJECTION INTRAMUSCULAR; INTRAVENOUS EVERY 4 HOURS PRN
Status: DISCONTINUED | OUTPATIENT
Start: 2018-07-23 | End: 2018-07-24

## 2018-07-23 RX ORDER — SODIUM CHLORIDE, SODIUM LACTATE, POTASSIUM CHLORIDE, CALCIUM CHLORIDE 600; 310; 30; 20 MG/100ML; MG/100ML; MG/100ML; MG/100ML
INJECTION, SOLUTION INTRAVENOUS CONTINUOUS
Status: DISCONTINUED | OUTPATIENT
Start: 2018-07-23 | End: 2018-07-24

## 2018-07-23 RX ORDER — ONDANSETRON 2 MG/ML
4 INJECTION INTRAMUSCULAR; INTRAVENOUS EVERY 6 HOURS PRN
Status: DISCONTINUED | OUTPATIENT
Start: 2018-07-23 | End: 2018-07-24

## 2018-07-23 RX ORDER — NALOXONE HYDROCHLORIDE 0.4 MG/ML
0.4 INJECTION, SOLUTION INTRAMUSCULAR; INTRAVENOUS; SUBCUTANEOUS PRN
Status: DISCONTINUED | OUTPATIENT
Start: 2018-07-23 | End: 2018-07-24

## 2018-07-23 RX ORDER — METHYLERGONOVINE MALEATE 0.2 MG/ML
200 INJECTION INTRAVENOUS PRN
Status: DISCONTINUED | OUTPATIENT
Start: 2018-07-23 | End: 2018-07-24

## 2018-07-23 RX ADMIN — SODIUM CHLORIDE, POTASSIUM CHLORIDE, SODIUM LACTATE AND CALCIUM CHLORIDE: 600; 310; 30; 20 INJECTION, SOLUTION INTRAVENOUS at 05:48

## 2018-07-23 RX ADMIN — ONDANSETRON 4 MG: 2 INJECTION INTRAMUSCULAR; INTRAVENOUS at 21:02

## 2018-07-23 RX ADMIN — SODIUM CHLORIDE, POTASSIUM CHLORIDE, SODIUM LACTATE AND CALCIUM CHLORIDE: 600; 310; 30; 20 INJECTION, SOLUTION INTRAVENOUS at 11:56

## 2018-07-23 RX ADMIN — Medication 16 ML/HR: at 17:30

## 2018-07-23 RX ADMIN — SODIUM CHLORIDE, POTASSIUM CHLORIDE, SODIUM LACTATE AND CALCIUM CHLORIDE: 600; 310; 30; 20 INJECTION, SOLUTION INTRAVENOUS at 17:26

## 2018-07-23 RX ADMIN — Medication 1 MILLI-UNITS/MIN: at 06:12

## 2018-07-23 RX ADMIN — SODIUM CHLORIDE, POTASSIUM CHLORIDE, SODIUM LACTATE AND CALCIUM CHLORIDE: 600; 310; 30; 20 INJECTION, SOLUTION INTRAVENOUS at 20:55

## 2018-07-23 NOTE — ANESTHESIA PRE PROCEDURE
SINGLE/MULTIPLE N/A 5/23/2018    EGD performed by Alicia Brantley MD at [facility]       Social History:    Social History   Substance Use Topics    Smoking status: Never Smoker    Smokeless tobacco: Never Used    Alcohol use Yes      Comment: Occasionally                                Counseling given: Not Answered      Vital Signs (Current):   Vitals:    07/23/18 1740 07/23/18 1745 07/23/18 1800 07/23/18 1815   BP: (!) 114/54 (!) 112/55 (!) 113/58 (!) 106/55   Pulse: 97 99 104 100   Resp: 18      Temp:       TempSrc:       SpO2: 100% 100%     Weight:       Height:                                                  BP Readings from Last 3 Encounters:   07/23/18 (!) 106/55   05/23/18 (!) 97/52   05/23/18 (!) 115/55       NPO Status: Time of last liquid consumption: 0430                        Time of last solid consumption: 2030                        Date of last liquid consumption: 07/23/18                        Date of last solid food consumption: 07/22/18    BMI:   Wt Readings from Last 3 Encounters:   07/23/18 256 lb (116.1 kg)   05/23/18 248 lb 12.8 oz (112.9 kg)   05/01/18 240 lb (108.9 kg)     Body mass index is 40.1 kg/m². CBC:   Lab Results   Component Value Date    WBC 11.8 07/23/2018    RBC 4.31 07/23/2018    RBC 5.22 11/02/2017    HGB 11.9 07/23/2018    HCT 36.3 07/23/2018    MCV 84.2 07/23/2018    RDW 13.5 12/21/2017     07/23/2018       CMP:   Lab Results   Component Value Date     12/21/2017    K 4.1 12/21/2017     12/21/2017    CO2 22 12/21/2017    BUN 6 12/21/2017    CREATININE 0.5 12/21/2017    LABGLOM >90 12/21/2017    GLUCOSE 107 12/21/2017    GLUCOSE 91 11/02/2017    PROT 6.7 01/30/2018    CALCIUM 8.8 12/21/2017    BILITOT 0.4 01/30/2018    ALKPHOS 70 01/30/2018    ALKPHOS 81 12/21/2017    AST 18 01/30/2018    ALT 22 01/30/2018       POC Tests: No results for input(s): POCGLU, POCNA, POCK, POCCL, POCBUN, POCHEMO, POCHCT in the last 72 hours.     Coags: No results found for: PROTIME, INR, APTT    HCG (If Applicable):   Lab Results   Component Value Date    PREGTESTUR POSITIVE 12/21/2017    PREGSERUM NEGATIVE 11/22/2017        ABGs: No results found for: PHART, PO2ART, YAP5GOU, TXH3XUM, BEART, D8TRTDRK     Type & Screen (If Applicable):  Lab Results   Component Value Date    [address] 07/23/2018       Anesthesia Evaluation  Patient summary reviewed and Nursing notes reviewed  Airway: Mallampati: III  TM distance: <3 FB   Neck ROM: full  Mouth opening: > = 3 FB Dental: normal exam         Pulmonary:normal exam  breath sounds clear to auscultation                             Cardiovascular:    (+) hypertension:, POLLARD:,         Rhythm: regular  Rate: normal                    Neuro/Psych:   (+) depression/anxiety             GI/Hepatic/Renal:   (+) liver disease (RM):, morbid obesity          Endo/Other:                     Abdominal:   (+) obese,         Vascular:                                        Anesthesia Plan      epidural     ASA 3             Anesthetic plan and risks discussed with patient. Plan discussed with CRNA.                   Valery Andrew MD   7/23/2018

## 2018-07-23 NOTE — FLOWSHEET NOTE
Dr Dat Jones in department update on VE of 2/70/-3, pitocin currently at 14 MU, pt rate pain a 3/10. No change in POC at this time.

## 2018-07-23 NOTE — PLAN OF CARE
Problem: Anxiety:  Goal: Level of anxiety will decrease  Level of anxiety will decrease   Outcome: Ongoing  Pt calm and cooperative family at bedside providing relief    Problem: Breathing Pattern - Ineffective:  Goal: Able to breathe comfortably  Able to breathe comfortably   Outcome: Ongoing  No signs of resp distress noted. Sp02 remains greater than 92% on room air. Respirations equal and unlabored. Problem: Fluid Volume - Imbalance:  Goal: Absence of intrapartum hemorrhage signs and symptoms  Absence of intrapartum hemorrhage signs and symptoms   Outcome: Ongoing  No vaginal bleeding noted, will continue to monitor. Problem: Infection - Intrapartum Infection:  Goal: Will show no infection signs and symptoms  Will show no infection signs and symptoms   Outcome: Ongoing  Vitals stable, pt afebrile, -GBS  Vitals stable, pt remains afebrile. FHT's remain reassuring, will continue to monitor. Problem: Labor Process - Prolonged:  Goal: Uterine contractions within specified parameters  Uterine contractions within specified parameters   Outcome: Ongoing  toco in place to trace any contractions that pt may have    Problem: Pain - Acute:  Goal: Able to cope with pain  Able to cope with pain   Outcome: Ongoing  Pt currently rates pain a 2/10, plans for an epidural as her labor continues, states her pain goal is a 6/10    Problem: Tissue Perfusion - Uteroplacental, Altered:  Goal: Absence of abnormal fetal heart rate pattern  Absence of abnormal fetal heart rate pattern   Outcome: Ongoing  Fetal Heart Tones remain reassuring. Continuous EFM in place.        Problem: Urinary Retention:  Goal: Urinary elimination within specified parameters  Urinary elimination within specified parameters   Outcome: Ongoing  Pt has bathroom privileges voids quantity sufficient    Problem: Discharge Planning:  Goal: Discharged to appropriate level of care  Discharged to appropriate level of care   Outcome: Ongoing  Pt aware of 2hr recovery period in L&D and then transfer to mom/baby for the remainder of her stay. Problem: Falls - Risk of:  Goal: Will remain free from falls  Will remain free from falls  Outcome: Ongoing  Pt aware to call for assistance if she needs to get out of bed call light in reach    Comments: Care plan reviewed with patient and Dina Lopez. Patient and Dina Lopez verbalize understanding of the plan of care and contribute to goal setting.

## 2018-07-23 NOTE — FLOWSHEET NOTE
Dr Radha Yip in department update given, last VE 3/70/-2, pitocin at 18 MU.   Order received that may increase pitocin to 30 MU

## 2018-07-23 NOTE — FLOWSHEET NOTE
Dr. Marzena Leahy text messaged for update. Return call. Updated on pt now 5cms, now getting a labor epidural. RN placed IUPC and it shows frequent contractions, so she decreased her pitocin. Once she gets the patient laying back she will re-evaluate and titrate pitocin if needed. RN notes moderate variability and accel.  Resume plan of care

## 2018-07-23 NOTE — FLOWSHEET NOTE
Pt arrives to 5c04 for scheduled induction for GHTN. + fetal movement per pt. Pt denies any leaking of fluid or vaginal bleeding. Pt instructed to change into a gown and admission then started. Patient to bathroom to void, informed of maternal drug testing policy in place on all laboring patients. Consent signed and urine sent.

## 2018-07-23 NOTE — H&P
[facility]  History and Physical Update    Pt Name: Eli Beckwith  MRN: [id number]  YOB: 1995  Date of evaluation: 2018    [] I have examined the patient and reviewed the H&P/Consult and there are no changes to the patient or plans. [x] I have examined the patient and reviewed the H&P/Consult and have noted the following changes:   16yo  at 45/2 for IOL due to gestational HTN. CE: /3. Cat 1 tracing. AROM with clear fluid. Induction via pitocin. GBS neg.          Vear Nageotte  Electronically signed 2018 at 8:07 AM

## 2018-07-23 NOTE — PLAN OF CARE
Problem: Anxiety:  Goal: Level of anxiety will decrease  Level of anxiety will decrease   Outcome: Ongoing  Pt remains calm about the birthing experience,  at bedside, supportive. All questions/concerns addressed by RN. Problem: Breathing Pattern - Ineffective:  Goal: Able to breathe comfortably  Able to breathe comfortably   Outcome: Ongoing  No signs of resp distress noted. Sp02 remains greater than 92% on room air. Respirations equal and unlabored. Problem: Fluid Volume - Imbalance:  Goal: Absence of intrapartum hemorrhage signs and symptoms  Absence of intrapartum hemorrhage signs and symptoms   Outcome: Ongoing  No s/s of active bleeding. Vitals WDL. Problem: Infection - Intrapartum Infection:  Goal: Will show no infection signs and symptoms  Will show no infection signs and symptoms   Outcome: Ongoing  Vitals stable, pt afebrile, -GBS      Problem: Labor Process - Prolonged:  Goal: Uterine contractions within specified parameters  Uterine contractions within specified parameters   Outcome: Ongoing  Scheduled induction for GHTN. Pitocin running. Pt planning on an epidural.    Problem: Pain - Acute:  Goal: Able to cope with pain  Able to cope with pain   Outcome: Ongoing  Pt denies any pain at this time. Pt planning on an epidural. Pain goal 6/10. Problem: Tissue Perfusion - Uteroplacental, Altered:  Goal: Absence of abnormal fetal heart rate pattern  Absence of abnormal fetal heart rate pattern   Outcome: Ongoing  Ultrasound in place tracing FHT. FHT remain reassuring. Problem: Urinary Retention:  Goal: Urinary elimination within specified parameters  Urinary elimination within specified parameters   Outcome: Ongoing  Pt able to void without difficulty. Voiding sufficient amount.       Problem: Discharge Planning:  Goal: Discharged to appropriate level of care  Discharged to appropriate level of care   Outcome: Ongoing  Pt aware she will remain on L&D 2 hours after delivery for

## 2018-07-24 PROCEDURE — 6370000000 HC RX 637 (ALT 250 FOR IP): Performed by: OBSTETRICS & GYNECOLOGY

## 2018-07-24 PROCEDURE — 2709999900 HC NON-CHARGEABLE SUPPLY

## 2018-07-24 PROCEDURE — 7200000001 HC VAGINAL DELIVERY

## 2018-07-24 PROCEDURE — 6370000000 HC RX 637 (ALT 250 FOR IP)

## 2018-07-24 PROCEDURE — 1220000000 HC SEMI PRIVATE OB R&B

## 2018-07-24 RX ORDER — HYDROCORTISONE ACETATE 25 MG/1
25 SUPPOSITORY RECTAL 2 TIMES DAILY PRN
Status: DISCONTINUED | OUTPATIENT
Start: 2018-07-24 | End: 2018-07-25 | Stop reason: HOSPADM

## 2018-07-24 RX ORDER — SODIUM CHLORIDE 0.9 % (FLUSH) 0.9 %
10 SYRINGE (ML) INJECTION EVERY 12 HOURS SCHEDULED
Status: DISCONTINUED | OUTPATIENT
Start: 2018-07-24 | End: 2018-07-25 | Stop reason: HOSPADM

## 2018-07-24 RX ORDER — HYDROCODONE BITARTRATE AND ACETAMINOPHEN 5; 325 MG/1; MG/1
2 TABLET ORAL EVERY 4 HOURS PRN
Status: DISCONTINUED | OUTPATIENT
Start: 2018-07-24 | End: 2018-07-25 | Stop reason: HOSPADM

## 2018-07-24 RX ORDER — IBUPROFEN 800 MG/1
TABLET ORAL
Status: COMPLETED
Start: 2018-07-24 | End: 2018-07-24

## 2018-07-24 RX ORDER — CARBOPROST TROMETHAMINE 250 UG/ML
250 INJECTION, SOLUTION INTRAMUSCULAR
Status: ACTIVE | OUTPATIENT
Start: 2018-07-24 | End: 2018-07-24

## 2018-07-24 RX ORDER — HYDROCODONE BITARTRATE AND ACETAMINOPHEN 5; 325 MG/1; MG/1
1 TABLET ORAL EVERY 4 HOURS PRN
Status: DISCONTINUED | OUTPATIENT
Start: 2018-07-24 | End: 2018-07-25 | Stop reason: HOSPADM

## 2018-07-24 RX ORDER — ONDANSETRON 2 MG/ML
4 INJECTION INTRAMUSCULAR; INTRAVENOUS EVERY 6 HOURS PRN
Status: DISCONTINUED | OUTPATIENT
Start: 2018-07-24 | End: 2018-07-25 | Stop reason: HOSPADM

## 2018-07-24 RX ORDER — METHYLERGONOVINE MALEATE 0.2 MG/ML
200 INJECTION INTRAVENOUS
Status: ACTIVE | OUTPATIENT
Start: 2018-07-24 | End: 2018-07-24

## 2018-07-24 RX ORDER — ZOLPIDEM TARTRATE 10 MG/1
10 TABLET ORAL NIGHTLY PRN
Status: DISCONTINUED | OUTPATIENT
Start: 2018-07-24 | End: 2018-07-25 | Stop reason: HOSPADM

## 2018-07-24 RX ORDER — IBUPROFEN 800 MG/1
800 TABLET ORAL EVERY 8 HOURS PRN
Status: DISCONTINUED | OUTPATIENT
Start: 2018-07-24 | End: 2018-07-25 | Stop reason: HOSPADM

## 2018-07-24 RX ORDER — SODIUM CHLORIDE 0.9 % (FLUSH) 0.9 %
10 SYRINGE (ML) INJECTION PRN
Status: DISCONTINUED | OUTPATIENT
Start: 2018-07-24 | End: 2018-07-25 | Stop reason: HOSPADM

## 2018-07-24 RX ORDER — DIPHENHYDRAMINE HCL 25 MG
50 TABLET ORAL EVERY 6 HOURS PRN
Status: DISCONTINUED | OUTPATIENT
Start: 2018-07-24 | End: 2018-07-25 | Stop reason: HOSPADM

## 2018-07-24 RX ORDER — DOCUSATE SODIUM 100 MG/1
100 CAPSULE, LIQUID FILLED ORAL 2 TIMES DAILY PRN
Status: DISCONTINUED | OUTPATIENT
Start: 2018-07-24 | End: 2018-07-25 | Stop reason: HOSPADM

## 2018-07-24 RX ORDER — ONDANSETRON 4 MG/1
8 TABLET, FILM COATED ORAL EVERY 8 HOURS PRN
Status: DISCONTINUED | OUTPATIENT
Start: 2018-07-24 | End: 2018-07-25 | Stop reason: HOSPADM

## 2018-07-24 RX ORDER — FERROUS SULFATE 325(65) MG
325 TABLET ORAL
Status: DISCONTINUED | OUTPATIENT
Start: 2018-07-24 | End: 2018-07-25 | Stop reason: HOSPADM

## 2018-07-24 RX ORDER — IBUPROFEN 800 MG/1
800 TABLET ORAL 3 TIMES DAILY
Status: DISCONTINUED | OUTPATIENT
Start: 2018-07-24 | End: 2018-07-24 | Stop reason: DRUGHIGH

## 2018-07-24 RX ORDER — LANOLIN 100 %
OINTMENT (GRAM) TOPICAL PRN
Status: DISCONTINUED | OUTPATIENT
Start: 2018-07-24 | End: 2018-07-25 | Stop reason: HOSPADM

## 2018-07-24 RX ORDER — ACETAMINOPHEN 325 MG/1
650 TABLET ORAL EVERY 4 HOURS PRN
Status: DISCONTINUED | OUTPATIENT
Start: 2018-07-24 | End: 2018-07-25 | Stop reason: HOSPADM

## 2018-07-24 RX ORDER — MORPHINE SULFATE 2 MG/ML
2 INJECTION, SOLUTION INTRAMUSCULAR; INTRAVENOUS
Status: DISCONTINUED | OUTPATIENT
Start: 2018-07-24 | End: 2018-07-25 | Stop reason: HOSPADM

## 2018-07-24 RX ORDER — SODIUM CHLORIDE, SODIUM LACTATE, POTASSIUM CHLORIDE, CALCIUM CHLORIDE 600; 310; 30; 20 MG/100ML; MG/100ML; MG/100ML; MG/100ML
INJECTION, SOLUTION INTRAVENOUS CONTINUOUS
Status: DISCONTINUED | OUTPATIENT
Start: 2018-07-24 | End: 2018-07-25 | Stop reason: HOSPADM

## 2018-07-24 RX ORDER — PREDNISONE 10 MG/1
10 TABLET ORAL 2 TIMES DAILY
Status: DISCONTINUED | OUTPATIENT
Start: 2018-07-24 | End: 2018-07-25 | Stop reason: HOSPADM

## 2018-07-24 RX ORDER — MORPHINE SULFATE 4 MG/ML
4 INJECTION, SOLUTION INTRAMUSCULAR; INTRAVENOUS
Status: DISCONTINUED | OUTPATIENT
Start: 2018-07-24 | End: 2018-07-25 | Stop reason: HOSPADM

## 2018-07-24 RX ADMIN — DOCUSATE SODIUM 100 MG: 100 CAPSULE, LIQUID FILLED ORAL at 10:27

## 2018-07-24 RX ADMIN — IBUPROFEN 800 MG: 800 TABLET ORAL at 02:03

## 2018-07-24 RX ADMIN — IBUPROFEN 800 MG: 800 TABLET ORAL at 10:28

## 2018-07-24 RX ADMIN — PREDNISONE 10 MG: 10 TABLET ORAL at 21:12

## 2018-07-24 RX ADMIN — PREDNISONE 10 MG: 10 TABLET ORAL at 10:27

## 2018-07-24 RX ADMIN — IBUPROFEN 800 MG: 800 TABLET ORAL at 18:50

## 2018-07-24 ASSESSMENT — PAIN SCALES - GENERAL
PAINLEVEL_OUTOF10: 0
PAINLEVEL_OUTOF10: 3
PAINLEVEL_OUTOF10: 3
PAINLEVEL_OUTOF10: 2

## 2018-07-24 NOTE — PLAN OF CARE
Problem: Anxiety:  Goal: Level of anxiety will decrease  Level of anxiety will decrease   Outcome: Ongoing  Pt remains calm about the birthing experience,  at bedside, supportive. All questions/concerns addressed by RN. Problem: Breathing Pattern - Ineffective:  Goal: Able to breathe comfortably  Able to breathe comfortably   Outcome: Ongoing  No signs of resp distress noted. Sp02 remains greater than 92% on room air. Respirations equal and unlabored. Problem: Fluid Volume - Imbalance:  Goal: Absence of intrapartum hemorrhage signs and symptoms  Absence of intrapartum hemorrhage signs and symptoms   Outcome: Ongoing  No vaginal bleeding noted, will continue to monitor. Problem: Infection - Intrapartum Infection:  Goal: Will show no infection signs and symptoms  Will show no infection signs and symptoms   Outcome: Ongoing  Vitals stable, pt remains afebrile. FHT's remain reassuring, will continue to monitor. Problem: Labor Process - Prolonged:  Goal: Uterine contractions within specified parameters  Uterine contractions within specified parameters   Outcome: Ongoing  Continuing Pitocin induction    Problem: Pain - Acute:  Goal: Able to cope with pain  Able to cope with pain   Outcome: Ongoing  Comfortable with epidural    Problem: Tissue Perfusion - Uteroplacental, Altered:  Goal: Absence of abnormal fetal heart rate pattern  Absence of abnormal fetal heart rate pattern   Outcome: Ongoing  Fetal Heart Tones remain reassuring. Continuous EFM in place.        Problem: Urinary Retention:  Goal: Urinary elimination within specified parameters  Urinary elimination within specified parameters   Outcome: Ongoing  Pt voiding sufficiently; will continue to montior      Problem: Discharge Planning:  Goal: Discharged to appropriate level of care  Discharged to appropriate level of care   Outcome: Ongoing  Pt aware of 2hr recovery period in L&D and then transfer to mom/baby for the remainder of her

## 2018-07-24 NOTE — PLAN OF CARE
Problem: Fluid Volume - Imbalance:  Goal: Absence of postpartum hemorrhage signs and symptoms  Absence of postpartum hemorrhage signs and symptoms   Outcome: Ongoing  Pt reports small amount of lochia, no clots    Problem: Pain - Acute:  Goal: Pain level will decrease  Pain level will decrease   Outcome: Ongoing  Patient reports back/abdominal pain and is being controlled with motrin. Pain goal is a 6. Pain goal is met    Problem: Discharge Planning:  Goal: Discharged to appropriate level of care  Discharged to appropriate level of care   Outcome: Ongoing  No discharge needs voiced from patient at this time. Patient expected to be discharged home with family. Problem: Constipation:  Goal: Bowel elimination is within specified parameters  Bowel elimination is within specified parameters   Outcome: Ongoing  Pt reports passing flatus, no BM since delivery. Pt taking colace    Problem: Infection - Risk of, Puerperal Infection:  Goal: Will show no infection signs and symptoms  Will show no infection signs and symptoms   Outcome: Ongoing  Pt afebrile this shift. Medications helped to reduced fever. Problem: Mood - Altered:  Goal: Mood stable  Mood stable   Outcome: Ongoing  Pt calm and cooperative, able to express needs    Comments: Care plan reviewed with patient. Patient verbalizes understanding of the plan of care and contribute to goal setting.

## 2018-07-24 NOTE — FLOWSHEET NOTE
Pt up to bathroom x1 assist, pt did own pericare. Pt to mom/baby via wheelchair and report given to ABILIO Hollins RN.

## 2018-07-24 NOTE — PROGRESS NOTES
Department of Obstetrics and Gynecology  Labor and Delivery  Attending Post Partum Progress Note    PPD #0    SUBJECTIVE: Feeling well. No complaints. C/o poison ivy    OBJECTIVE:     Vitals:  BP (!) 120/57   Pulse 91   Temp 98.1 °F (36.7 °C) (Oral)   Resp 16   Ht 5' 7\" (1.702 m)   Wt 256 lb (116.1 kg)   LMP  (Within Days)   SpO2 100%   Breastfeeding? Unknown   BMI 40.10 kg/m²     Uterus:  normal size, well involuted, firm, non-tender    DATA:    Hemoglobin:   Lab Results   Component Value Date    HGB 11.9 07/23/2018       ASSESSMENT & PLAN:  Doing well. Anticipate home on post partum day #2.   Poison ivy- start prednison, disc going home with medrol dose pack    Carlos A Herrera 7/24/2018

## 2018-07-24 NOTE — PLAN OF CARE
Problem: Fluid Volume - Imbalance:  Goal: Absence of postpartum hemorrhage signs and symptoms  Absence of postpartum hemorrhage signs and symptoms  Outcome: Ongoing  Small amount of bleeding noted with assessment    Problem: Pain - Acute:  Goal: Pain level will decrease  Pain level will decrease  Outcome: Ongoing  Pt denies pain at this time. Pain goal of #6    Problem: Constipation:  Goal: Bowel elimination is within specified parameters  Bowel elimination is within specified parameters  Outcome: Ongoing  Denies passing gas, discussed ways to promote bowel function, understanding verbalized    Problem: Infection - Risk of, Puerperal Infection:  Goal: Will show no infection signs and symptoms  Will show no infection signs and symptoms  Outcome: Ongoing  Vital signs stable    Problem: Mood - Altered:  Goal: Mood stable  Mood stable  Outcome: Ongoing  Pt pleasant with visitors and staff as well as with infant. Comments: Care plan reviewed with patient and she contributes to goal setting and voices understanding of plan of care.

## 2018-07-24 NOTE — PROGRESS NOTES
Department of Obstetrics and Gynecology  Spontaneous Vaginal Delivery Note      Pre-operative Diagnosis:  MATERNITY  Post-operative Diagnosis:  SAME    Information for the patient's :  Angel Luis Starr [[id number]]                    Infant Wt:   Information for the patient's :  Angel Luis Starr [[id number]]             APGARS:     Information for the patient's :  Angel Luis Starr [[id number]]             Anesthesia:  EPIDURAL, LOCAL, OR NONE        Delivery Summary:  Labor & Delivery Summary  Dilation Complete Date: 18  Dilation Complete Time: 0026       Estimated blood loss:    300 ML

## 2018-07-25 VITALS
DIASTOLIC BLOOD PRESSURE: 67 MMHG | RESPIRATION RATE: 16 BRPM | WEIGHT: 256 LBS | HEIGHT: 67 IN | TEMPERATURE: 98.1 F | SYSTOLIC BLOOD PRESSURE: 114 MMHG | BODY MASS INDEX: 40.18 KG/M2 | HEART RATE: 87 BPM | OXYGEN SATURATION: 99 %

## 2018-07-25 PROCEDURE — 6370000000 HC RX 637 (ALT 250 FOR IP): Performed by: OBSTETRICS & GYNECOLOGY

## 2018-07-25 RX ORDER — METHYLPREDNISOLONE 4 MG/1
TABLET ORAL
Qty: 1 KIT | Refills: 0 | Status: SHIPPED | OUTPATIENT
Start: 2018-07-25 | End: 2018-07-31

## 2018-07-25 RX ADMIN — DOCUSATE SODIUM 100 MG: 100 CAPSULE, LIQUID FILLED ORAL at 09:15

## 2018-07-25 RX ADMIN — PREDNISONE 10 MG: 10 TABLET ORAL at 09:12

## 2018-07-25 RX ADMIN — IBUPROFEN 800 MG: 800 TABLET ORAL at 04:10

## 2018-07-25 ASSESSMENT — PAIN SCALES - GENERAL: PAINLEVEL_OUTOF10: 3

## 2018-07-25 NOTE — PLAN OF CARE
Problem: Fluid Volume - Imbalance:  Goal: Absence of postpartum hemorrhage signs and symptoms  Absence of postpartum hemorrhage signs and symptoms   Outcome: Ongoing  Small amount of rubra lochia noted. Vitals WNL. Denies passing clots. Problem: Pain - Acute:  Goal: Pain level will decrease  Pain level will decrease   Outcome: Ongoing  Managing pain with po medication and heating pad. Problem: Discharge Planning:  Goal: Discharged to appropriate level of care  Discharged to appropriate level of care    Outcome: Ongoing  Working on discharge, would like to go home on 7/25. Will continue to evaluate for needs. Problem: Constipation:  Goal: Bowel elimination is within specified parameters  Bowel elimination is within specified parameters   Outcome: Ongoing   Encouraged to increase fiber and fluid in diet. Problem: Infection - Risk of, Puerperal Infection:  Goal: Will show no infection signs and symptoms  Will show no infection signs and symptoms   Outcome: Ongoing  No signs or symptoms of infection. Vitals WNL. Problem: Mood - Altered:  Goal: Mood stable  Mood stable   Outcome: Ongoing  Calm and cooperative; bonding well with infant. Comments: Care plan reviewed with patient and she contributes to goal setting and voices understanding of plan of care.

## 2018-07-25 NOTE — FLOWSHEET NOTE
Postpartum education brochure given, teaching complete. [place] postpartum depression screening discussed with patient, instructed to contact her healthcare provider if her score is > 10. Patient voiced understanding.  Mother's blood type is A+

## 2018-07-25 NOTE — DISCHARGE SUMMARY
Vaginal Delivery Discharge Summary    Gestational Age:37w3d    Antepartum complications: Gestational HTN    Admission date: 2018  5:30 AM      Type of Delivery:       Data  Information for the patient's :  Ksenia Aguillon [[id number]]   male  Birth Weight: 7 lb 7.4 oz (3.385 kg)      Labs: CBC   Lab Results   Component Value Date    HGB 11.9 (L) 2018    HCT 36.3 (L) 2018        Intrapartum complications: None    Postpartum complications: none    The patient is ambulating well. The patient is tolerating a normal diet. Patient Instructions: Activity: activity as tolerated and no sex for 6 weeks  Diet: regular  Wound Care: as directed    Discharge Information  Current Discharge Medication List      START taking these medications    Details   methylPREDNISolone (MEDROL DOSEPACK) 4 MG tablet Take by mouth. Qty: 1 kit, Refills: 0         CONTINUE these medications which have NOT CHANGED    Details   ranitidine (ZANTAC) 150 MG tablet Take 1 tablet by mouth 2 times daily  Qty: 60 tablet, Refills: 5    Associated Diagnoses: Gastroesophageal reflux disease, esophagitis presence not specified      folic acid (FOLVITE) 1 MG tablet Take 1 mg by mouth daily      Pediatric Multiple Vit-C-FA (FLINSTONES GUMMIES OMEGA-3 DHA PO) Take by mouth             No discharge procedures on file.     Plan:   Follow up in 5 week(s)    Electronically signed by Terri Hogan MD on 2018 at 8:24 AM

## 2018-08-22 ENCOUNTER — OFFICE VISIT (OUTPATIENT)
Dept: CARDIOLOGY CLINIC | Age: 23
End: 2018-08-22
Payer: COMMERCIAL

## 2018-08-22 VITALS
WEIGHT: 229 LBS | HEART RATE: 80 BPM | BODY MASS INDEX: 35.94 KG/M2 | DIASTOLIC BLOOD PRESSURE: 62 MMHG | SYSTOLIC BLOOD PRESSURE: 124 MMHG | HEIGHT: 67 IN

## 2018-08-22 DIAGNOSIS — R07.89 OTHER CHEST PAIN: Primary | ICD-10-CM

## 2018-08-22 PROCEDURE — 99213 OFFICE O/P EST LOW 20 MIN: CPT | Performed by: INTERNAL MEDICINE

## 2018-08-22 NOTE — PROGRESS NOTES
[facility]  [address]  [address] [address]  Dept: [phone]  Dept Fax: [phone]  Loc: [phone]    Visit Date: 8/22/2018    Ms. Eboni Garza is a 24 y.o. female  who presented for:  Chief Complaint   Patient presents with    3 Month Follow-Up     chest pain/gestational hypertension        HPI:   HPI   23 yo F presents post vaginal delivery, uncomplicated; no issues with pushing. Intermittent but non-specific chest discomfort from time to time. No chest pain, angina, POLLARD, orthopnea, PND, sob at rest, palpitations, LE edema, or syncope. GB removed. NSR. Current Outpatient Prescriptions:     NONFORMULARY, daily Pt taking A-F beta food. , Disp: , Rfl:     ranitidine (ZANTAC) 150 MG tablet, Take 1 tablet by mouth 2 times daily, Disp: 60 tablet, Rfl: 5    folic acid (FOLVITE) 1 MG tablet, Take 1 mg by mouth daily, Disp: , Rfl:     Pediatric Multiple Vit-C-FA (FLINSTONES GUMMIES OMEGA-3 DHA PO), Take by mouth, Disp: , Rfl:     Past Medical History  Catrachita Peguero  has a past medical history of Gestational hypertension; Morbid obesity with BMI of 40.0-44.9, adult ([facility] [address] S/P cholecystectomy; and Trauma. Social History  Catrachita Peguero  reports that she has never smoked. She has never used smokeless tobacco. She reports that she drinks alcohol. She reports that she does not use drugs. Family History  Suad family history includes Alcohol Abuse in her maternal grandfather; Cancer in her maternal grandfather; Dementia in her maternal grandmother; No Known Problems in her father and mother; Rectal Cancer in her maternal aunt. There is no family history of bicuspid aortic valve, aneurysms, heart transplant, pacemakers, defibrillators, or sudden cardiac death.       Past Surgical History   Past Surgical History:   Procedure Laterality Date    CHOLECYSTECTOMY, LAPAROSCOPIC N/A 10/13/2017    LAP CHOLECYSTECTOMY WITH CHOLANGIOGRAM performed by Johanna Ramsay Female                  F      MR #           [id number]      Race                Other                                    Ethnicity            or       Account #      [de-identified]      Room Number      Accession      [id number]      Date of Study       11/14/2017   Number      Date of Birth  1995     Referring Physician Nikunj Irais AYALA      Age            20 year(s)     Hansa Li MD                                 Physician     Procedure    Type of Study      TTE procedure:ECHOCARDIOGRAM COMPLETE 2D W DOPPLER W COLOR. Procedure Date  Date: 11/14/2017 Start: 10:32 AM    Study Location: Echo Lab  Technical Quality: Adequate visualization    Indications:Dyspnea on exertion. Additional Medical History:Hypertension, chest pain    Patient Status: Routine    Height: 65 inches Weight: 256.01 pounds BSA: 2.2 m^2 BMI: 42.6 kg/m^2    BP: 114/84 mmHg     Conclusions      Summary   Normal left ventricle size and systolic function. Ejection fraction was estimated at 55-60%. There were no regional left ventricular wall motion abnormalities and wall   thickness was within normal limits. Left atrium was mild-moderately dilated. The right ventricular size was normal with normal systolic function and   wall thickness. No significant valvular abnormalities. The pericardium was normal in appearance with no evidence of a pericardial   effusion. Signature      ----------------------------------------------------------------   Electronically signed by Jenny Perez MD (Interpreting   physician) on 11/15/2017 at 03:45 PM   ----------------------------------------------------------------      Findings      Mitral Valve   The mitral valve structure was normal with normal leaflet separation.    DOPPLER: The transmitral velocity was within the normal range with no   evidence for mitral ESV/LV ESV      RV Diastolic Dimension: 2.9 cm   Septum          Index: 37.9 KM/62 m^2   Diastolic: 0.8  EF Calculated: 56.8 %       LA/Aorta: 1.15   cm     Doppler Measurements & Calculations      MV Peak E-Wave: 67.6  AV Peak Velocity: 122  LVOT Peak Velocity: 95.5 cm/s   cm/s                  cm/s                   LVOT Mean Velocity: 64.5 cm/s   MV Peak A-Wave: 60.7  AV Peak Gradient: 5.95 LVOT Peak Gradient: 4   cm/s                  mmHg                   mmHgLVOT Mean Gradient: 2   MV E/A Ratio: 1.11    AV Mean Velocity: 84.9 mmHg   MV Peak Gradient:     cm/s   1.83 mmHg             AV Mean Gradient: 3    TV Peak E-Wave: 53.3 cm/s                         mmHg                   TV Peak A-Wave: 51.3 cm/s   MV Deceleration Time: AV VTI: 23.4 cm   183 msec                                     TV Peak Gradient: 1.14 mmHg                            LVOT VTI: 16.6 cm      PV Peak Velocity: 79.5 cm/s   MV E' Septal          IVRT: 77 msec          PV Peak Gradient: 2.53 mmHg   Velocity: 11 cm/s   MV A' Septal   Velocity: 9.55 cm/s   AV DVI (VTI): 0.71AV   MV E' Lateral         DVI (Vmax):0.78   Velocity: 15 cm/s   MV A' Lateral   Velocity: 10.4 cm/s   E/E' septal: 6.15   E/E' lateral: 4.51     [email or web address] [id number]        Assessment/Plan   Atypical, non-cardiac chest pain - continue monitor; if still persistent, consider CTA of the chest.  PPI/H2 blocker.        Disposition:  prn         Electronically signed by Arie Recinos MD   8/22/2018 at 4:24 PM

## 2018-09-15 DIAGNOSIS — K21.9 GASTROESOPHAGEAL REFLUX DISEASE, ESOPHAGITIS PRESENCE NOT SPECIFIED: ICD-10-CM

## 2018-09-15 RX ORDER — RANITIDINE 150 MG/1
150 TABLET ORAL 2 TIMES DAILY
Qty: 180 TABLET | Refills: 3 | Status: SHIPPED | OUTPATIENT
Start: 2018-09-15 | End: 2020-06-11

## 2020-06-11 ENCOUNTER — TELEMEDICINE (OUTPATIENT)
Dept: FAMILY MEDICINE CLINIC | Age: 25
End: 2020-06-11
Payer: COMMERCIAL

## 2020-06-11 PROBLEM — R68.89 HEAT INTOLERANCE: Status: RESOLVED | Noted: 2017-11-02 | Resolved: 2020-06-11

## 2020-06-11 PROBLEM — R07.89 OTHER CHEST PAIN: Status: RESOLVED | Noted: 2017-11-02 | Resolved: 2020-06-11

## 2020-06-11 PROBLEM — R00.2 PALPITATIONS: Status: RESOLVED | Noted: 2017-11-02 | Resolved: 2020-06-11

## 2020-06-11 PROBLEM — R94.39 ABNORMAL CARDIOVASCULAR STRESS TEST: Status: RESOLVED | Noted: 2017-11-20 | Resolved: 2020-06-11

## 2020-06-11 PROBLEM — F41.9 ANXIETY: Status: RESOLVED | Noted: 2017-11-02 | Resolved: 2020-06-11

## 2020-06-11 PROBLEM — E04.1 THYROID NODULE: Status: RESOLVED | Noted: 2017-11-02 | Resolved: 2020-06-11

## 2020-06-11 PROBLEM — R06.09 DOE (DYSPNEA ON EXERTION): Status: RESOLVED | Noted: 2017-11-02 | Resolved: 2020-06-11

## 2020-06-11 PROCEDURE — 99214 OFFICE O/P EST MOD 30 MIN: CPT | Performed by: FAMILY MEDICINE

## 2020-06-11 RX ORDER — NORGESTIMATE AND ETHINYL ESTRADIOL 7DAYSX3 28
1 KIT ORAL DAILY
COMMUNITY
Start: 2020-06-09

## 2020-06-11 RX ORDER — PENICILLIN V POTASSIUM 500 MG/1
500 TABLET ORAL 2 TIMES DAILY
Qty: 20 TABLET | Refills: 0 | Status: SHIPPED | OUTPATIENT
Start: 2020-06-11 | End: 2020-06-21

## 2020-06-11 ASSESSMENT — PATIENT HEALTH QUESTIONNAIRE - PHQ9
1. LITTLE INTEREST OR PLEASURE IN DOING THINGS: 0
SUM OF ALL RESPONSES TO PHQ QUESTIONS 1-9: 0
SUM OF ALL RESPONSES TO PHQ QUESTIONS 1-9: 0
2. FEELING DOWN, DEPRESSED OR HOPELESS: 0
SUM OF ALL RESPONSES TO PHQ9 QUESTIONS 1 & 2: 0

## 2020-06-11 NOTE — PROGRESS NOTES
Yes  Dysphagia? No  Cough? No  Rhinitis? No  Hx of EBV? No  Sick Contacts? No    Tonsillar Exudate? yes  Tender, Anterior Adenopathy? yes  Cough Absent? yes  Fever present? yes    Pt denies SOB, wheezing, stridor, nausea or vomiting.       -GERD:    HPI:  Doing better now  Was having it quite a bit  Gets occ sxs  No dysphagia  Off meds      -Obesity:  wts at 240  Doing doing better when could exercise  Making good diet changes  Trying to exercise more      -NAFLD:  Noted on labs/rads a few years ago  Overdue for f/u  Working on wt loss as above  No etoh use  Denies RUQ pain or jaundice  Due for labs      Age/Gender Health Maintenance    Lipid -   Lab Results   Component Value Date    CHOL 209 (H) 11/02/2017     Lab Results   Component Value Date    TRIG 139 11/02/2017     Lab Results   Component Value Date    HDL 46 11/02/2017     Lab Results   Component Value Date    LDLCALC 135 (H) 11/02/2017     Lab Results   Component Value Date    LABVLDL 28 11/02/2017     Lab Results   Component Value Date    CHOLHDLRATIO 4.5 11/02/2017         DM Screen -   Lab Results   Component Value Date    GLUCOSE 107 12/21/2017    GLUCOSE 91 11/02/2017       Colon Cancer Screening - age 48  Lung Cancer Screening (Age 54 to [de-identified] with 30 pack year hx, current smoker or quit within past 15 years) - n/a    Tetanus - pt will check her records  Influenza Vaccine - Candidate FALL 2020  Pneumonia Vaccine - age 72  Zoster - age 48     Breast Cancer Screening - age 44-55  Cervical Cancer Screening - records pending, Dr. Herrera Hatfield  Osteoporosis Screening - age 72    Falls screening - age 61      Current Outpatient Medications   Medication Sig Dispense Refill    TRI FEMYNOR 0.18/0.215/0.25 MG-35 MCG TABS Take 1 tablet by mouth daily      penicillin v potassium (VEETID) 500 MG tablet Take 1 tablet by mouth 2 times daily for 10 days 20 tablet 0    Pediatric Multiple Vit-C-FA (FLINSTONES GUMMIES OMEGA-3 DHA PO) Take by mouth       No current facility-administered medications for this visit. Orders Placed This Encounter   Medications    penicillin v potassium (VEETID) 500 MG tablet     Sig: Take 1 tablet by mouth 2 times daily for 10 days     Dispense:  20 tablet     Refill:  0         All medications reviewed and reconciled, including OTC and herbal medications. Updated list given to patient. Patient Active Problem List    Diagnosis Date Noted    NAFLD (nonalcoholic fatty liver disease)     Obesity (BMI 30-39. 9)     Gestational hypertension      resolved after pregnancy. no Pre-ecclampsia      S/P cholecystectomy        Past Medical History:   Diagnosis Date    Gestational hypertension     2017, 2018    Morbid obesity with BMI of 40.0-44.9, adult ([facility] [address]     NAFLD (nonalcoholic fatty liver disease)     Obesity (BMI 30-39. 9)     S/P cholecystectomy     Trauma     fall 12/23/16       Past Surgical History:   Procedure Laterality Date    CHOLECYSTECTOMY, LAPAROSCOPIC N/A 10/13/2017    LAP CHOLECYSTECTOMY WITH CHOLANGIOGRAM performed by Nyla Alvarez MD at [address] (LOWER) Left 5/23/2018    ENDOSCOPIC ULTRASOUND [facility]e [facility] [place]    MO EGD TRANSORAL BIOPSY SINGLE/MULTIPLE N/A 5/23/2018    [facility] [place] [facility]       No Known Allergies      Social History     Tobacco Use    Smoking status: Never Smoker    Smokeless tobacco: Never Used   Substance Use Topics    Alcohol use: Yes     Comment: Occasionally       Family History   Problem Relation Age of Onset    No Known Problems Mother     No Known Problems Father     Dementia Maternal Grandmother     Cancer Maternal Grandfather         Unclear type    Alcohol Abuse Maternal Grandfather     Rectal Cancer Maternal Aunt     Colon Cancer Neg Hx     Breast Cancer Neg Hx          I have reviewed the patient's past medical history, past surgical history, allergies, medications, social and

## 2020-06-25 ENCOUNTER — OFFICE VISIT (OUTPATIENT)
Dept: FAMILY MEDICINE CLINIC | Age: 25
End: 2020-06-25
Payer: COMMERCIAL

## 2020-06-25 VITALS
TEMPERATURE: 99.1 F | DIASTOLIC BLOOD PRESSURE: 84 MMHG | OXYGEN SATURATION: 99 % | RESPIRATION RATE: 10 BRPM | HEIGHT: 65 IN | SYSTOLIC BLOOD PRESSURE: 122 MMHG | BODY MASS INDEX: 41.32 KG/M2 | WEIGHT: 248 LBS | HEART RATE: 85 BPM

## 2020-06-25 PROCEDURE — 99213 OFFICE O/P EST LOW 20 MIN: CPT | Performed by: FAMILY MEDICINE

## 2020-06-25 NOTE — PATIENT INSTRUCTIONS
home?  · Take baths until your doctor says you can take showers again. Avoid getting water in the ear until after the problem clears up. Ask your doctor if you should use earplugs to keep water out of your ears. · Do not swim until your doctor says you can. · If you get water in your ears, turn your head to each side and pull the earlobe in different directions. This will help the water run out. If your ears are still wet, use a hair dryer set on the lowest heat. Hold the dryer several inches from your ear. · Use your medicines exactly as prescribed. Call your doctor if you think you are having a problem with your medicine. Do not put drops in your ears unless your doctor prescribes them. When should you call for help? Watch closely for changes in your health, and be sure to contact your doctor if you have any problems. Where can you learn more? Go to [email or web address] org and sign in to your gDine account. Enter A377 in the Vendavo box to learn more about \"Keeping Ears Dry: Care Instructions. \"     If you do not have an account, please click on the \"Sign Up Now\" link. Current as of: July 29, 2019               Content Version: 12.5  © 0245-1754 Healthwise, Incorporated. Care instructions adapted under license by [facility] ([facility]). If you have questions about a medical condition or this instruction, always ask your healthcare professional. [place] [address] any warranty or liability for your use of this information.

## 2020-06-25 NOTE — PROGRESS NOTES
Chief Complaint   Patient presents with    Ear Problem     left ear itching and painful    Other     spot on tonsil    Lymphadenopathy     L side neck       History obtained from the patient. SUBJECTIVE:  Anisa Cueva is a 31 y.o. female that presents today for       -L Ear Complaint:    HPI:  Started 2 days ago  Pain in L ear  Scant drainage  Some swollen LN on L neck  No fevers  No other sxs    Inciting incident or history of trauma? no  Decreased hearing? No  Ear tenderness? Yes  Ear drainage? Yes  Feeling of fullness? Yes  Radiation of the pain? No  Dizziness or pre-syncope? No  Affected by position or head movment? No  Sore throat, rhinorrhea or sinus congestion? No  Hx of Bruxism? No  Treatment tried and response - none      -Tonsil issue:  Has white spot on back part of L tonsil.    Not sure how long been there  Not having any pain  Not terribly big  Recent treated for strep, but this does not look like an exudate      Age/Gender Health Maintenance    Lipid -   Lab Results   Component Value Date    CHOL 209 (H) 11/02/2017     Lab Results   Component Value Date    TRIG 139 11/02/2017     Lab Results   Component Value Date    HDL 46 11/02/2017     Lab Results   Component Value Date    LDLCALC 135 (H) 11/02/2017     Lab Results   Component Value Date    LABVLDL 28 11/02/2017     Lab Results   Component Value Date    CHOLHDLRATIO 4.5 11/02/2017         DM Screen -   Lab Results   Component Value Date    GLUCOSE 107 12/21/2017    GLUCOSE 91 11/02/2017       Colon Cancer Screening - age 48  Lung Cancer Screening (Age 54 to [de-identified] with 30 pack year hx, current smoker or quit within past 15 years) - n/a    Tetanus - pt will check her records  Influenza Vaccine - Candidate FALL 2020  Pneumonia Vaccine - age 72  Zoster - age 48     Breast Cancer Screening - age 44-55  Cervical Cancer Screening - records pending, Dr. Chapis Dumont  Osteoporosis Screening - age 72    Falls screening - age 61        Current by Leo Jack DO on 6/25/2020 at 4:16 PM

## 2020-07-23 LAB
ALBUMIN SERPL-MCNC: 4.1 G/DL (ref 3.2–5.3)
ALK PHOSPHATASE: 67 U/L (ref 39–130)
ALT SERPL-CCNC: 18 U/L (ref 0–31)
ANION GAP SERPL CALCULATED.3IONS-SCNC: 11 MMOL/L (ref 5–15)
AST SERPL-CCNC: 21 U/L (ref 0–41)
BILIRUB SERPL-MCNC: 0.4 MG/DL (ref 0.3–1.2)
BUN BLDV-MCNC: 8 MG/DL (ref 5–23)
CALCIUM SERPL-MCNC: 9.8 MG/DL (ref 8.5–10.5)
CHLORIDE BLD-SCNC: 105 MMOL/L (ref 98–109)
CHOLESTEROL/HDL RATIO: 5.3 (ref 1–5)
CHOLESTEROL: 228 MG/DL (ref 150–200)
CO2: 22 MMOL/L (ref 22–32)
CREAT SERPL-MCNC: 0.82 MG/DL (ref 0.4–1)
EGFR AFRICAN AMERICAN: >60 ML/MIN/1.73SQ.M
EGFR IF NONAFRICAN AMERICAN: >60 ML/MIN/1.73SQ.M
GLUCOSE: 88 MG/DL (ref 65–99)
HDLC SERPL-MCNC: 43 MG/DL
LDL CHOLESTEROL CALCULATED: 144 MG/DL
LDL/HDL RATIO: 3.3
POTASSIUM SERPL-SCNC: 4 MMOL/L (ref 3.5–5)
SODIUM BLD-SCNC: 138 MMOL/L (ref 134–146)
TOTAL PROTEIN: 7.8 G/DL (ref 6–8)
TRIGL SERPL-MCNC: 204 MG/DL (ref 27–150)
TSH SERPL DL<=0.05 MIU/L-ACNC: 3.04 UIU/ML (ref 0.49–4.67)
VLDLC SERPL CALC-MCNC: 41 MG/DL (ref 0–30)

## 2020-07-24 ENCOUNTER — TELEPHONE (OUTPATIENT)
Dept: FAMILY MEDICINE CLINIC | Age: 25
End: 2020-07-24

## 2020-07-24 NOTE — TELEPHONE ENCOUNTER
----- Message from Lindsay Briggs, DO sent at 7/24/2020  6:25 AM EDT -----  Please let pt know that labs look good other than lipids a little higher than previous  Recommend lower fat diet, con't to work on wt loss  Repeat labs in a year or so   Let me know if questions, thanks!

## 2023-03-23 ENCOUNTER — OFFICE VISIT (OUTPATIENT)
Dept: FAMILY MEDICINE CLINIC | Age: 28
End: 2023-03-23
Payer: COMMERCIAL

## 2023-03-23 VITALS
BODY MASS INDEX: 35.12 KG/M2 | WEIGHT: 223.8 LBS | RESPIRATION RATE: 14 BRPM | OXYGEN SATURATION: 100 % | HEART RATE: 73 BPM | HEIGHT: 67 IN | DIASTOLIC BLOOD PRESSURE: 80 MMHG | TEMPERATURE: 98.2 F | SYSTOLIC BLOOD PRESSURE: 122 MMHG

## 2023-03-23 DIAGNOSIS — R53.83 FATIGUE, UNSPECIFIED TYPE: Primary | ICD-10-CM

## 2023-03-23 DIAGNOSIS — E16.2 HYPOGLYCEMIA: ICD-10-CM

## 2023-03-23 DIAGNOSIS — M25.50 POLYARTHRALGIA: ICD-10-CM

## 2023-03-23 DIAGNOSIS — E01.0 THYROMEGALY: ICD-10-CM

## 2023-03-23 DIAGNOSIS — Z00.00 VISIT FOR PREVENTIVE HEALTH EXAMINATION: Primary | ICD-10-CM

## 2023-03-23 PROCEDURE — 99395 PREV VISIT EST AGE 18-39: CPT | Performed by: FAMILY MEDICINE

## 2023-03-23 RX ORDER — MAGNESIUM 30 MG
30 TABLET ORAL 2 TIMES DAILY
COMMUNITY

## 2023-03-23 ASSESSMENT — PATIENT HEALTH QUESTIONNAIRE - PHQ9
SUM OF ALL RESPONSES TO PHQ9 QUESTIONS 1 & 2: 0
1. LITTLE INTEREST OR PLEASURE IN DOING THINGS: 0
SUM OF ALL RESPONSES TO PHQ QUESTIONS 1-9: 0
2. FEELING DOWN, DEPRESSED OR HOPELESS: 0
SUM OF ALL RESPONSES TO PHQ QUESTIONS 1-9: 0

## 2023-03-23 NOTE — PROGRESS NOTES
Dyspnea  Respiratory:  Cough, Wheezing, Shortness of breath, Chest tightness, Apnea  Gastrointestinal:  Nausea, Vomiting, Diarrhea, Constipation, Heartburn, Blood in stool  Genitourinary:  Difficulty or painful urination, Flank pain, Change in frequency, Urgency  Skin:  Color change, Rash, Itching, Wound  Psychiatric:  Hallucinations, Anxiety, Depression, Suicidal ideation  Hematological:  Enlarged glands, Easy bleeding, Easily bruising  Musculoskeletal:  Joint pain, Back pain, Gait problems, Joint swelling, Myalgias  Neurological:  Dizziness, Headaches, Presyncope, Numbness, Seizures, Tremors  Allergy:  Environmental allergies, Food allergies  Endocrine:  Heat Intolerance, Cold Intolerance, Polydipsia, Polyphagia, Polyuria      PHYSICAL EXAM:  Vitals:    03/23/23 1353   BP: 122/80   Pulse: 73   Resp: 14   Temp: 98.2 °F (36.8 °C)   SpO2: 100%   Weight: 223 lb 12.8 oz (101.5 kg)   Height: 5' 7\" (1.702 m)     Body mass index is 35.05 kg/m². VS Reviewed  General Appearance: A&O x 3, No acute distress,well developed and well- nourished  Head: normocephalic and atraumatic  Eyes: pupils equal, round, and reactive to light, extraocular eye movements intact, conjunctivae and eye lids without erythema  ENT: external ear and ear canal clear bilaterally, TMs intact and regular, nose without deformity, nasal mucosa and turbinates normal without polyps, oropharynx normal, dentition is normal for age  Neck: supple and non-tender without mass, ? Inc size of R side of thyroid gland compared with the left. No thyroid nodules, no cervical lymphadenopathy  Pulmonary/Chest: clear to auscultation bilaterally- no wheezes, rales or rhonchi, normal air movement, no respiratory distress or retractions  Cardiovascular: S1 and S2 auscultated w/ RRR. No murmurs, rubs, clicks, or gallops, distal pulses intact. Abdomen: soft, non-tender, non-distended, bowel sounds physiologic,  no rebound or guarding, no masses or hernias noted.  Liver

## 2023-03-31 ENCOUNTER — TELEPHONE (OUTPATIENT)
Dept: FAMILY MEDICINE CLINIC | Age: 28
End: 2023-03-31

## 2023-03-31 NOTE — TELEPHONE ENCOUNTER
I called and spoke to Sydni Simpson and she verbalized understanding and had no questions at this time.

## 2023-03-31 NOTE — TELEPHONE ENCOUNTER
----- Message from Pascual Scott DO sent at 3/31/2023  7:18 AM EDT -----  Please let pt know that xrays all look ok  No inflammatory or erosive changes  Con't with plan from office  Let me know if questions, thanks!

## 2023-04-06 ENCOUNTER — HOSPITAL ENCOUNTER (OUTPATIENT)
Dept: ULTRASOUND IMAGING | Age: 28
Discharge: HOME | End: 2023-04-06
Payer: COMMERCIAL

## 2023-04-06 DIAGNOSIS — E01.0 THYROMEGALY: ICD-10-CM

## 2023-04-06 PROCEDURE — 76536 US EXAM OF HEAD AND NECK: CPT

## 2023-04-07 ENCOUNTER — TELEPHONE (OUTPATIENT)
Dept: FAMILY MEDICINE CLINIC | Age: 28
End: 2023-04-07

## 2023-04-07 NOTE — TELEPHONE ENCOUNTER
----- Message from Jean-Claude Benites DO sent at 4/7/2023  9:10 AM EDT -----  Please let pt know that thyroid US looked good  No concerning findings  Thyroid gland is normal  No abnormal lymph nodes either  Let me know if questions, thanks!

## 2023-04-17 LAB — PROINSULIN: 4.8 PMOL/L

## 2023-04-18 ENCOUNTER — TELEPHONE (OUTPATIENT)
Dept: FAMILY MEDICINE CLINIC | Age: 28
End: 2023-04-18

## 2023-04-18 NOTE — TELEPHONE ENCOUNTER
----- Message from Anisa Terry DO sent at 4/17/2023  6:42 PM EDT -----  Please let pt know that just got all labs back yesterday afternoon. All are WNL  F/u as planned  Let me know if questions, thanks!

## 2023-04-19 NOTE — TELEPHONE ENCOUNTER
Left message on answering machine requesting pt to call back at earliest convenience.       My chart message sent

## 2023-04-19 NOTE — PROGRESS NOTES
1.00 - 4.00 K/uL Final    Absolute Mono # 04/12/2023 0.65  0.20 - 1.00 K/uL Final    Absolute Eos # 04/12/2023 0.11  0.00 - 0.50 K/uL Final    Absolute Baso # 04/12/2023 0.06  0.00 - 0.20 K/uL Final    MPV 04/12/2023 10.9  9.3 - 13.0 fL Final    Glucose 04/12/2023 101 (H)  70 - 99 mg/dL Final    BUN 04/12/2023 7  6 - 20 mg/dL Final    Creatinine 04/12/2023 0.80  0.60 - 1.30 mg/dL Final    EGFR IF NonAfrican American 04/12/2023 102  >60 mL/min/1.73 Final    Calcium 04/12/2023 9.7  8.5 - 10.5 mg/dL Final    Sodium 04/12/2023 138  133 - 146 meq/L Final    Potassium 04/12/2023 3.8  3.5 - 5.4 meq/L Final    Chloride 04/12/2023 101  95 - 107 meq/L Final    CO2 04/12/2023 22  19 - 31 meq/L Final    Anion Gap 04/12/2023 15.0  7.0 - 16.0 meq/L Final    Total Bilirubin 04/12/2023 0.5  <=1.2 mg/dL Final    Alk Phosphatase 04/12/2023 72  40 - 112 U/L Final    AST 04/12/2023 19  9 - 40 U/L Final    ALT 04/12/2023 14  5 - 40 U/L Final    Total Protein 04/12/2023 7.5  6.1 - 8.3 g/dL Final    Albumin 04/12/2023 4.8  3.5 - 5.2 g/dL Final    Insulin 04/12/2023 13  2 - 21 UIU/mL Final    C-Peptide 04/12/2023 3.1  1.1 - 4.4 NG/ML Final    Beta-Hydroxybutyrate 04/12/2023 0.5  0.0 - 3.0 mg/dL Final    Hemoglobin A1C 04/12/2023 5.1  4.2 - 5.6 % Final    AVERAGE GLUCOSE 04/12/2023 100  <117 mg/dL Final    Proinsulin 04/12/2023 4.8  <=8.0 pmol/L Final    TSH 04/12/2023 1.81  0.400 - 4.10 uIu/mL Final    JAVAD 04/12/2023 NEGATIVE  NEGATIVE Final    CRP 04/12/2023 <0.3  <0.5 mg/dL Final    JAVAD Pattern 04/12/2023 SEE BELOW   Final    Homogeneous Pattern 04/12/2023 NEGATIVE  NEGATIVE TITER Final    Speckled Pattern 04/12/2023 NEGATIVE  NEGATIVE TITER Final    Dense Fine Speckled Pattern 04/12/2023 NEGATIVE  NEGATIVE TITER Final    Centromere Pattern 04/12/2023 NEGATIVE  NEGATIVE TITER Final    Coarse Speckled Pattern 04/12/2023 NEGATIVE  NEGATIVE TITER Final    Discrete Nuclear Dots Pattern 04/12/2023 NEGATIVE  NEGATIVE TITER Final    Nucleolar

## 2023-04-20 ENCOUNTER — OFFICE VISIT (OUTPATIENT)
Dept: FAMILY MEDICINE CLINIC | Age: 28
End: 2023-04-20
Payer: COMMERCIAL

## 2023-04-20 VITALS
BODY MASS INDEX: 35.06 KG/M2 | OXYGEN SATURATION: 98 % | HEART RATE: 68 BPM | WEIGHT: 223.4 LBS | DIASTOLIC BLOOD PRESSURE: 82 MMHG | RESPIRATION RATE: 18 BRPM | SYSTOLIC BLOOD PRESSURE: 132 MMHG | TEMPERATURE: 97.7 F | HEIGHT: 67 IN

## 2023-04-20 DIAGNOSIS — E16.2 HYPOGLYCEMIA: ICD-10-CM

## 2023-04-20 DIAGNOSIS — E66.9 OBESITY (BMI 30-39.9): Primary | ICD-10-CM

## 2023-04-20 DIAGNOSIS — R53.83 FATIGUE, UNSPECIFIED TYPE: ICD-10-CM

## 2023-04-20 DIAGNOSIS — E01.0 THYROMEGALY: ICD-10-CM

## 2023-04-20 DIAGNOSIS — M25.50 POLYARTHRALGIA: ICD-10-CM

## 2023-04-20 DIAGNOSIS — G57.02 PIRIFORMIS SYNDROME OF LEFT SIDE: ICD-10-CM

## 2023-04-20 LAB
CONTROL: 1234
PREGNANCY TEST URINE, POC: NEGATIVE

## 2023-04-20 PROCEDURE — 99214 OFFICE O/P EST MOD 30 MIN: CPT | Performed by: FAMILY MEDICINE

## 2023-04-20 PROCEDURE — 81025 URINE PREGNANCY TEST: CPT | Performed by: FAMILY MEDICINE

## 2023-04-20 RX ORDER — PHENTERMINE HYDROCHLORIDE 37.5 MG/1
37.5 TABLET ORAL
Qty: 30 TABLET | Refills: 0 | Status: SHIPPED | OUTPATIENT
Start: 2023-04-20 | End: 2023-05-20

## 2023-05-04 ENCOUNTER — HOSPITAL ENCOUNTER (OUTPATIENT)
Dept: PHYSICAL THERAPY | Age: 28
Setting detail: THERAPIES SERIES
Discharge: HOME | End: 2023-05-04
Payer: COMMERCIAL

## 2023-05-04 PROCEDURE — 97162 PT EVAL MOD COMPLEX 30 MIN: CPT

## 2023-05-04 NOTE — PROGRESS NOTES
** PLEASE SIGN, DATE AND TIME CERTIFICATION BELOW AND RETURN TO [facility] OUTPATIENT REHABILITATION (FAX #: [phone]). ATTEST/CO-SIGN IF ACCESSING VIA [employer]. THANK YOU.**    I certify that I have examined the patient below and determined that Physical Medicine and Rehabilitation service is necessary and that I approve the established plan of care for up to 90 days or as specifically noted. Attestation, signature or co-signature of physician indicates approval of certification requirements.    ________________________ ____________ __________  Physician Signature   Date   Time  [address] [facility]  PHYSICAL THERAPY  [x] EVALUATION  [] DAILY NOTE (LAND) [] DAILY NOTE (AQUATIC ) [] PROGRESS NOTE [] DISCHARGE NOTE    [x] [facility]   [] [place] [address]    [] [facility]   [] [facility]    Date: 2023  Patient Name:  Aureliano Munoz  : 1995  MRN: [id number]  CSN: [id number]    Referring Practitioner Jay Baez DO   Diagnosis Piriformis syndrome of left side [G57.02]    Treatment Diagnosis Left LE radicular pain, difficulty with ambulation   Date of Evaluation 23    Additional Pertinent History Fractured spine as a child-unsure what levels      Functional Outcome Measure Used Oswestry   Functional Outcome Score 17, 37.78% disabled (23)       Insurance: Primary: Payor: Claudine Mcdaniel /  /  / ,   Secondary:    Authorization Information: PRE CERTIFICATION REQUIRED: NO  INSURANCE THERAPY BENEFIT:  [address] [facility]: YES  MODALITIES COVERED:  YES, NO IONTO, NO MASSAGE    Visit # 1, 1/10 for progress note   Visits Allowed: 30   Recertification Date:    Physician Follow-Up: End of May   Physician Orders: Eval and treat   History of Present Illness: Patient reports she broke her back when she was younger and everything healed, but as she's had children, her pain has gotten worse.

## 2023-05-08 ENCOUNTER — HOSPITAL ENCOUNTER (OUTPATIENT)
Dept: PHYSICAL THERAPY | Age: 28
Setting detail: THERAPIES SERIES
Discharge: HOME | End: 2023-05-08
Payer: COMMERCIAL

## 2023-05-08 PROCEDURE — 97140 MANUAL THERAPY 1/> REGIONS: CPT

## 2023-05-08 PROCEDURE — 97035 APP MDLTY 1+ULTRASOUND EA 15: CPT

## 2023-05-08 NOTE — PROGRESS NOTES
[address] [facility]  PHYSICAL THERAPY  [] EVALUATION  [x] DAILY NOTE (LAND) [] DAILY NOTE (AQUATIC ) [] PROGRESS NOTE [] DISCHARGE NOTE    [] [address] [facility]   [] [place] [address]    [] [address] [facility]   [x] Alex Santana    Date: 2023  Patient Name:  Izabel Olsen  : 1995  MRN: [id number]  CSN: [id number]    Referring Practitioner Michelle Ross DO   Diagnosis Piriformis syndrome of left side [G57.02]    Treatment Diagnosis Left LE radicular pain, difficulty with ambulation   Date of Evaluation 23    Additional Pertinent History Fractured spine as a child-unsure what levels      Functional Outcome Measure Used Oswestry   Functional Outcome Score 17, 37.78% disabled (23)       Insurance: Primary: Payor: Robert Mckeon /  /  / ,   Secondary:    Authorization Information: PRE CERTIFICATION REQUIRED: NO  INSURANCE THERAPY BENEFIT:  30 VISITS ALLOWED EACH AT A HARD MAX   AQUATIC THERAPY COVERED: YES  MODALITIES COVERED:  YES, NO IONTO, NO MASSAGE    Visit #  2 2/10 for progress note   Visits Allowed: 30   Recertification Date:    Physician Follow-Up: End of May   Physician Orders: Eval and treat   History of Present Illness: Patient reports she broke her back when she was younger and everything healed, but as she's had children, her pain has gotten worse. Pain starts in left lower back and radiates down sometimes all the way to toes. Pain getting worse as she gets older. Has not had an MRI, but has had xrays which did not show anything. SUBJECTIVE: no increased pain after eval .        TREATMENT   Precautions: Fractured spine as a child-unsure what levels   Pain:  2-3 /10 left low back and down left leg, into the toes. \"X in shaded column indicates activity completed today    *\" next to exercise/intervention indicates progression   Modalities Parameters/  Location  Notes   US cont  left piriformis, and S!  Joint  1.3 for

## 2023-05-11 ENCOUNTER — HOSPITAL ENCOUNTER (OUTPATIENT)
Dept: PHYSICAL THERAPY | Age: 28
Setting detail: THERAPIES SERIES
Discharge: HOME | End: 2023-05-11
Payer: COMMERCIAL

## 2023-05-11 PROCEDURE — 97035 APP MDLTY 1+ULTRASOUND EA 15: CPT

## 2023-05-11 PROCEDURE — 97530 THERAPEUTIC ACTIVITIES: CPT

## 2023-05-11 PROCEDURE — 97140 MANUAL THERAPY 1/> REGIONS: CPT

## 2023-05-11 NOTE — PROGRESS NOTES
[address] [facility]  PHYSICAL THERAPY  [] EVALUATION  [x] DAILY NOTE (LAND) [] DAILY NOTE (AQUATIC ) [] PROGRESS NOTE [] DISCHARGE NOTE    [] [address] [facility]   [] [place] [address]    [] [address] [facility]   [x] Alex Santana    Date: 2023  Patient Name:  Izabel Olsen  : 1995  MRN: [id number]  CSN: [id number]    Referring Practitioner Michelle Ross DO   Diagnosis Piriformis syndrome of left side [G57.02]    Treatment Diagnosis Left LE radicular pain, difficulty with ambulation   Date of Evaluation 23    Additional Pertinent History Fractured spine as a child-unsure what levels      Functional Outcome Measure Used Oswestry   Functional Outcome Score 17, 37.78% disabled (23)       Insurance: Primary: Payor: Robert Mckeon /  /  / ,   Secondary:    Authorization Information: PRE CERTIFICATION REQUIRED: NO  INSURANCE THERAPY BENEFIT:  30 VISITS ALLOWED EACH AT A HARD MAX   AQUATIC THERAPY COVERED: YES  MODALITIES COVERED:  YES, NO IONTO, NO MASSAGE    Visit # 3, 3/10 for progress note   Visits Allowed: 30   Recertification Date:    Physician Follow-Up: End of May   Physician Orders: Eval and treat   History of Present Illness: Patient reports she broke her back when she was younger and everything healed, but as she's had children, her pain has gotten worse. Pain starts in left lower back and radiates down sometimes all the way to toes. Pain getting worse as she gets older. Has not had an MRI, but has had xrays which did not show anything. SUBJECTIVE: Reports pain is constant. TREATMENT   Precautions: Fractured spine as a child-unsure what levels   Pain:3/10 left low back and down left leg, into the toes.      \"X in shaded column indicates activity completed today    *\" next to exercise/intervention indicates progression   Modalities Parameters/  Location  Notes   US cont  left piriformis, and SI Joint  1.3 for 8 min,  x

## 2023-05-15 ENCOUNTER — HOSPITAL ENCOUNTER (OUTPATIENT)
Dept: PHYSICAL THERAPY | Age: 28
Setting detail: THERAPIES SERIES
Discharge: HOME | End: 2023-05-15
Payer: COMMERCIAL

## 2023-05-15 PROCEDURE — 97110 THERAPEUTIC EXERCISES: CPT

## 2023-05-15 PROCEDURE — 97035 APP MDLTY 1+ULTRASOUND EA 15: CPT

## 2023-05-15 PROCEDURE — 97140 MANUAL THERAPY 1/> REGIONS: CPT

## 2023-05-15 NOTE — PROGRESS NOTES
[facility]  [] EVALUATION  [x] DAILY NOTE (LAND) [] DAILY NOTE (AQUATIC ) [] PROGRESS NOTE [] DISCHARGE NOTE    [] [facility]   [] [place]    [] [address] [facility]   [x] [employer]    Date: 5/15/2023  Patient Name:  Don Gonzalez  : 1995  MRN: [id number]  CSN: [id number]    Referring Practitioner Edgardo Live DO   Diagnosis Piriformis syndrome of left side [G57.02]    Treatment Diagnosis Left LE radicular pain, difficulty with ambulation   Date of Evaluation 23    Additional Pertinent History Fractured spine as a child-unsure what levels      Functional Outcome Measure Used Oswestry   Functional Outcome Score 17, 37.78% disabled (23)       Insurance: Primary: Payor: Chika Chavis /  /  / ,   Secondary:    Authorization Information: PRE CERTIFICATION REQUIRED: NO  INSURANCE THERAPY BENEFIT:  30 VISITS [id number] [place] [address] THERAPY COVERED: YES  MODALITIES COVERED:  YES, NO IONTO, NO MASSAGE    Visit # 4 4/10 for progress note   Visits Allowed:    Recertification Date: 40   Physician Follow-Up: End of May   Physician Orders: Eval and treat   History of Present Illness: Patient reports she broke her back when she was younger and everything healed, but as she's had children, her pain has gotten worse. Pain starts in left lower back and radiates down sometimes all the way to toes. Pain getting worse as she gets older. Has not had an MRI, but has had xrays which did not show anything. SUBJECTIVE: less pain after stretching.         TREATMENT   Precautions: Fractured spine as a child-unsure what levels   Pain:3/10 left low back and down left leg to mid thigh     \"X in shaded column indicates activity completed today    *\" next to exercise/intervention indicates progression   Modalities Parameters/  Location  Notes   US cont  left piriformis, and SI Joint  1.3 for 8 min,  x

## 2023-05-18 ENCOUNTER — NURSE ONLY (OUTPATIENT)
Dept: FAMILY MEDICINE CLINIC | Age: 28
End: 2023-05-18

## 2023-05-18 ENCOUNTER — TELEPHONE (OUTPATIENT)
Dept: FAMILY MEDICINE CLINIC | Age: 28
End: 2023-05-18

## 2023-05-18 ENCOUNTER — HOSPITAL ENCOUNTER (OUTPATIENT)
Dept: PHYSICAL THERAPY | Age: 28
Setting detail: THERAPIES SERIES
Discharge: HOME | End: 2023-05-18
Payer: COMMERCIAL

## 2023-05-18 VITALS — BODY MASS INDEX: 32.89 KG/M2 | WEIGHT: 210 LBS

## 2023-05-18 DIAGNOSIS — E66.9 OBESITY (BMI 30-39.9): Primary | ICD-10-CM

## 2023-05-18 PROCEDURE — 97140 MANUAL THERAPY 1/> REGIONS: CPT

## 2023-05-18 PROCEDURE — 97035 APP MDLTY 1+ULTRASOUND EA 15: CPT

## 2023-05-18 PROCEDURE — 97110 THERAPEUTIC EXERCISES: CPT

## 2023-05-18 RX ORDER — PHENTERMINE HYDROCHLORIDE 37.5 MG/1
37.5 TABLET ORAL
Qty: 30 TABLET | Refills: 0 | Status: SHIPPED | OUTPATIENT
Start: 2023-05-18 | End: 2023-06-17

## 2023-05-18 NOTE — TELEPHONE ENCOUNTER
ASSESSMENT & PLAN   Diagnosis Orders   1. Obesity (BMI 30-39.9)  phentermine (ADIPEX-P) 37.5 MG tablet          OAARS reviewed and appropriate. Controlled Substances Monitoring: Periodic Controlled Substance Monitoring: Possible medication side effects, risk of tolerance/dependence & alternative treatments discussed., No signs of potential drug abuse or diversion identified.  Raymond Barker DO)      Future Appointments   Date Time Provider Lily Naylori   5/22/2023  1:00 PM [address], PT STRNADIA RIOJAS PT [place] HOD   5/25/2023  1:00 PM [address], PT TOMASA RIOJAS PT [place] HOD   5/30/2023  1:00 PM Clarine Counter STRZ [address] HOD   6/1/2023  2:00 PM [address], PT STRZ WAP PT [place] HOD   7/20/2023  2:20 PM Raymond Barker DO [facility]

## 2023-05-18 NOTE — PROGRESS NOTES
Prone                Manual Therapy Time/Technique  Notes   Prone piriformis release X 3  x Prone    STM   x          Exercise/  Intervention   Notes   NuStep seat 7  no arms  X 5 min  x           Supine :            HS stretch with strap 3 x 15 sec ea  x         IT band (L) 3 x 15 sec ea  x Manual overpressure        SKTC   3 x 15 sec ea  x         Piriformis stretch  ant hip short stretch also  3 x 15 sec ea  x Reports tightness on L side          Tilt for nuetral    abd sets  5 sec x 10       Mini squat  5 reps       Wall sit demonstrated for LE strengthening                  Specific Interventions Next Treatment: Ultrasound to left piriformis, manual therapy, stretches    Activity/Treatment Tolerance:  [x]  Patient tolerated treatment well  []  Patient limited by fatigue  []  Patient limited by pain   []  Patient limited by medical complications  []  Other:     Assessment: Added L IT band stretch this session. Body Structures/Functions/Activity Limitations: impaired activity tolerance, impaired endurance, impaired ROM, impaired sensation, impaired strength, pain, abnormal gait, and abnormal posture  Prognosis: good    GOALS:  Patient Goal: \"To not have to take any medication. \"    Short Term Goals:  Time Frame: 4 weeks   Improve Oswestry to <14 to assist with taking care of children. Improve posture needing no cues for correction to assist with preventing injury. Decrease pain in left leg to no more than 50% of leg to assist with ambulaiton. Patient to report pain in low back no more than 4/10 to assist with sleeping at night. Increase left hip strength to 4+/5 to assist with ambulation. Long Term Goals:  Time Frame: 12 weeks  Independent with HEP and with progression to assist with decreasing pain.         Patient Education:   [x]  HEP/Education Completed: Plan of Care, Goals  [facility] Access Code:  []  No new Education completed  []  Reviewed Prior HEP      []  Patient verbalized and/or

## 2023-05-18 NOTE — TELEPHONE ENCOUNTER
PATIENT PRESENTS FOR WEIGHT CHECK      Wt Readings from Last 3 Encounters:   05/18/23 210 lb (95.3 kg)   04/20/23 223 lb 6.4 oz (101.3 kg)   03/23/23 223 lb 12.8 oz (101.5 kg)

## 2023-05-22 ENCOUNTER — HOSPITAL ENCOUNTER (OUTPATIENT)
Dept: PHYSICAL THERAPY | Age: 28
Setting detail: THERAPIES SERIES
Discharge: HOME | End: 2023-05-22
Payer: COMMERCIAL

## 2023-05-22 PROCEDURE — 97140 MANUAL THERAPY 1/> REGIONS: CPT

## 2023-05-22 NOTE — PROGRESS NOTES
[address] [facility]  PHYSICAL THERAPY  [] EVALUATION  [x] DAILY NOTE (LAND) [] DAILY NOTE (AQUATIC ) [] PROGRESS NOTE [] DISCHARGE NOTE    [] 615 [facility]   [] [place] [address]    [] [address] [facility]   [x] Eva Cho    Date: 2023  Patient Name:  Jason Schofield  : 1995  MRN: [id number]  CSN: [id number]    Referring Practitioner Yashira Richards DO   Diagnosis Piriformis syndrome of left side [G57.02]    Treatment Diagnosis Left LE radicular pain, difficulty with ambulation   Date of Evaluation 23    Additional Pertinent History Fractured spine as a child-unsure what levels      Functional Outcome Measure Used Oswestry   Functional Outcome Score 17, 37.78% disabled (23)       Insurance: Primary: Payor: Yazmin Woodruff /  /  / ,   Secondary:    Authorization Information: PRE CERTIFICATION REQUIRED: NO  INSURANCE THERAPY BENEFIT:  [address] THERAPY COVERED: YES  MODALITIES COVERED:  YES, NO IONTO, NO MASSAGE    Visit # 6,6/10 for progress note   Visits Allowed: 30   Recertification Date: 54   Physician Follow-Up: End of May   Physician Orders: Eval and treat   History of Present Illness: Patient reports she broke her back when she was younger and everything healed, but as she's had children, her pain has gotten worse. Pain starts in left lower back and radiates down sometimes all the way to toes. Pain getting worse as she gets older. Has not had an MRI, but has had xrays which did not show anything. SUBJECTIVE: Having a good day today; little to no pain. She has not had any one thing that seems to have alleviated her pain. Dry Needling Safety Questions Response   Are you currently receiving any form of cancer treatment? No   Do you have any form of a bleeding disorder? No   Have you ever fainted or experienced a seizure? No   Do you have a pacemaker or any other electrical implant?   No

## 2023-05-25 ENCOUNTER — HOSPITAL ENCOUNTER (OUTPATIENT)
Dept: PHYSICAL THERAPY | Age: 28
Setting detail: THERAPIES SERIES
Discharge: HOME | End: 2023-05-25
Payer: COMMERCIAL

## 2023-05-25 PROCEDURE — 97140 MANUAL THERAPY 1/> REGIONS: CPT

## 2023-05-25 NOTE — PROGRESS NOTES
taking any anticoagulants? No   Are you currently taking antibiotics for an infection? No   Do you have a damaged heart valve, metal prosthesis, or other risk of infection? No   Are you pregnant or actively trying for a pregnancy? No   Do you have breast implants? No   Do you suffer from metal allergies? No   Are you diabetic or do you suffer from impaired wound healing? No   Do you have hepatitis B, hepatitis C, HIV, or any other infectious disease? No   Have you eaten in the last two hours?  No           TREATMENT   Precautions: Fractured spine as a child-unsure what levels   Pain: 2/10 left low back and down left leg to mid thigh     \"X in shaded column indicates activity completed today    *\" next to exercise/intervention indicates progression   Modalities Parameters/  Location  Notes   US cont  left piriformis, and SI Joint  1.3 for 8 min  Prone                Manual Therapy Time/Technique  Notes   Prone piriformis release X 3   Prone    STM             DN: Homeostatic points 14, 16, 15, 22 50 and 60mm x    DN: paravertebral points L3, L4  40mm x    DN: symptomatic points along superior ridge of ilium on left side  50 mm needles  x                      Exercise/  Intervention   Notes   NuStep seat 7  no arms  X 5 min             Supine :            HS stretch with strap 3 x 15 sec ea           IT band (L) 3 x 15 sec ea   Manual overpressure        SKTC   3 x 15 sec ea           Piriformis stretch  ant hip short stretch also  3 x 15 sec ea   Reports tightness on L side          Tilt for nuetral    abd sets  5 sec x 10       Mini squat  5 reps       Wall sit demonstrated for LE strengthening                  Specific Interventions Next Treatment: Ultrasound to left piriformis, manual therapy, stretches    Activity/Treatment Tolerance:  [x]  Patient tolerated treatment well  []  Patient limited by fatigue  []  Patient limited by pain   []  Patient limited by medical complications  []  Other:     Assessment:

## 2023-05-30 ENCOUNTER — HOSPITAL ENCOUNTER (OUTPATIENT)
Dept: PHYSICAL THERAPY | Age: 28
Setting detail: THERAPIES SERIES
Discharge: HOME | End: 2023-05-30
Payer: COMMERCIAL

## 2023-05-30 PROCEDURE — 97530 THERAPEUTIC ACTIVITIES: CPT

## 2023-05-30 PROCEDURE — 97035 APP MDLTY 1+ULTRASOUND EA 15: CPT

## 2023-05-30 NOTE — PROGRESS NOTES
[address] [facility]  PHYSICAL THERAPY  [] EVALUATION  [x] DAILY NOTE (LAND) [] DAILY NOTE (AQUATIC ) [] PROGRESS NOTE [] DISCHARGE NOTE    [] 615 [facility]   [] Luis     [] [address] [facility]   [x] Brenda Pontiff    Date: 2023  Patient Name:  Madiha Woods  : 1995  MRN: [id number]  CSN: [id number]    Referring Practitioner Sabiha Chavira DO   Diagnosis Piriformis syndrome of left side [G57.02]    Treatment Diagnosis Left LE radicular pain, difficulty with ambulation   Date of Evaluation 23    Additional Pertinent History Fractured spine as a child-unsure what levels      Functional Outcome Measure Used Oswestry   Functional Outcome Score 17, 37.78% disabled (23)       Insurance: Primary: Payor: Evelio Whitmore 150 /  /  / ,   Secondary:    Authorization Information: PRE CERTIFICATION REQUIRED: NO  INSURANCE THERAPY BENEFIT:  [address] THERAPY COVERED: YES  MODALITIES COVERED:  YES, NO IONTO, NO MASSAGE    Visit # 7,7/10 for progress note   Visits Allowed:    Recertification Date:    Physician Follow-Up: End of May   Physician Orders: Eval and treat   History of Present Illness: Patient reports she broke her back when she was younger and everything healed, but as she's had children, her pain has gotten worse. Pain starts in left lower back and radiates down sometimes all the way to toes. Pain getting worse as she gets older. Has not had an MRI, but has had xrays which did not show anything. SUBJECTIVE: Reports needling did work well initially, but is no longer giving the same relief.              TREATMENT   Precautions: Fractured spine as a child-unsure what levels   Pain: 3/10 left low back and down left leg to mid thigh     \"X in shaded column indicates activity completed today    *\" next to exercise/intervention indicates progression   Modalities Parameters/  Location  Notes

## 2023-06-01 ENCOUNTER — HOSPITAL ENCOUNTER (OUTPATIENT)
Dept: PHYSICAL THERAPY | Age: 28
Setting detail: THERAPIES SERIES
Discharge: HOME | End: 2023-06-01
Payer: COMMERCIAL

## 2023-06-01 PROCEDURE — 97530 THERAPEUTIC ACTIVITIES: CPT

## 2023-06-01 PROCEDURE — 97110 THERAPEUTIC EXERCISES: CPT

## 2023-06-01 PROCEDURE — 97140 MANUAL THERAPY 1/> REGIONS: CPT

## 2023-06-01 NOTE — PROGRESS NOTES
[address] [facility]  PHYSICAL THERAPY  [] EVALUATION  [] DAILY NOTE (LAND) [] DAILY NOTE (AQUATIC ) [x] PROGRESS NOTE [] DISCHARGE NOTE    [] OUTPATIENT REHABILITATION CENTER [facility]   [] Luis Haja    [] [address] [place]   [x] Julio Curtis    Date: 2023  Patient Name:  Taylor Perry  : 1995  MRN: [id number]  CSN: [id number]    Referring Practitioner Vicente Jenkins DO   Diagnosis Piriformis syndrome of left side [G57.02]    Treatment Diagnosis Left LE radicular pain, difficulty with ambulation   Date of Evaluation 23    Additional Pertinent History Fractured spine as a child-unsure what levels      Functional Outcome Measure Used Oswestry   Functional Outcome Score 17, 37.78% disabled (23)       Insurance: Primary: Payor: Rosy Chandra /  /  / ,   Secondary:    Authorization Information: PRE CERTIFICATION REQUIRED: NO  INSURANCE THERAPY BENEFIT:  30 VISITS ALLOWED EACH AT A HARD MAX   AQUATIC THERAPY COVERED: YES  MODALITIES COVERED:  YES, NO IONTO, NO MASSAGE    Visit # 8, 8/10 for progress note   Visits Allowed: 30   Recertification Date: 70   Physician Follow-Up: End of May   Physician Orders: Eval and treat   History of Present Illness: Patient reports she broke her back when she was younger and everything healed, but as she's had children, her pain has gotten worse. Pain starts in left lower back and radiates down sometimes all the way to toes. Pain getting worse as she gets older. Has not had an MRI, but has had xrays which did not show anything. SUBJECTIVE: Unfortunately she has not noticed much improvement with therapy.              TREATMENT   Precautions: Fractured spine as a child-unsure what levels   Pain: 3/10 left low back and down left leg to mid thigh     \"X in shaded column indicates activity completed today    *\" next to exercise/intervention indicates progression   Modalities Parameters/  Location  Notes   US cont  left

## 2023-06-09 ENCOUNTER — TELEPHONE (OUTPATIENT)
Dept: FAMILY MEDICINE CLINIC | Age: 28
End: 2023-06-09

## 2023-06-09 DIAGNOSIS — G57.02 PIRIFORMIS SYNDROME OF LEFT SIDE: Primary | ICD-10-CM

## 2023-06-09 RX ORDER — PREDNISONE 20 MG/1
40 TABLET ORAL DAILY
Qty: 10 TABLET | Refills: 0 | Status: SHIPPED | OUTPATIENT
Start: 2023-06-09 | End: 2023-06-14

## 2023-06-09 NOTE — TELEPHONE ENCOUNTER
Received note from PT    [facility] Dr. Abraham Arredondo,     I just completed a re-assessment on Suad and read the radiology reports from her most recent x-rays completed in March. The findings of her SI x-ray are copied below:     IMPRESSION:     1. No erosion or irregularity of the SI joints. 2.  Bilateral osteitis condensans ilii     Having never heard of bilateral osteitis condensans illil, I did a Google search and discovered that it is self-limiting and involves sclerotic changes in iliac bone. Conservative therapy is indicated along with NSAIDS. Trixie Bridges has not responded favorably to therapy at this time and I am putting her on hold pending follow up with you. She will contact the office by Monday if she has not heard from your office. We discussed a course of NSAIDs and to continue with the current core stretches/exercises that do not aggravate her symptoms. At this time I feel that she needs to get her pain better controlled before therapy can be effective-thoughts? Thank you so much for your referrals-we always enjoy your patients and they speak highly of you! Laila Murphy \"Barb\" Rustam Campos, DPT   [id number] \"      Spoke to pt. Most of her pain is L sided piriformis area yet  No improvement with PT    If some of this is related to osteitis condensans ilii, should respond to steroids. Will do 5 days prednisone, 40mg. Will f/u with her next wk to see how doing. osteitis condensans ilii is typically a benign, self limiting condition. Diagnosis Orders   1.  Piriformis syndrome of left side  predniSONE (DELTASONE) 20 MG tablet

## 2023-06-15 ENCOUNTER — TELEPHONE (OUTPATIENT)
Dept: FAMILY MEDICINE CLINIC | Age: 28
End: 2023-06-15

## 2023-06-15 DIAGNOSIS — E66.9 OBESITY (BMI 30-39.9): Primary | ICD-10-CM

## 2023-06-15 RX ORDER — PHENTERMINE HYDROCHLORIDE 37.5 MG/1
37.5 TABLET ORAL
Qty: 30 TABLET | Refills: 0 | Status: SHIPPED | OUTPATIENT
Start: 2023-06-15 | End: 2023-07-15

## 2023-07-19 SDOH — ECONOMIC STABILITY: FOOD INSECURITY: WITHIN THE PAST 12 MONTHS, THE FOOD YOU BOUGHT JUST DIDN'T LAST AND YOU DIDN'T HAVE MONEY TO GET MORE.: NEVER TRUE

## 2023-07-19 SDOH — ECONOMIC STABILITY: INCOME INSECURITY: HOW HARD IS IT FOR YOU TO PAY FOR THE VERY BASICS LIKE FOOD, HOUSING, MEDICAL CARE, AND HEATING?: NOT VERY HARD

## 2023-07-19 SDOH — ECONOMIC STABILITY: HOUSING INSECURITY
IN THE LAST 12 MONTHS, WAS THERE A TIME WHEN YOU DID NOT HAVE A STEADY PLACE TO SLEEP OR SLEPT IN A SHELTER (INCLUDING NOW)?: NO

## 2023-07-19 SDOH — ECONOMIC STABILITY: TRANSPORTATION INSECURITY
IN THE PAST 12 MONTHS, HAS LACK OF TRANSPORTATION KEPT YOU FROM MEETINGS, WORK, OR FROM GETTING THINGS NEEDED FOR DAILY LIVING?: NO

## 2023-07-19 SDOH — ECONOMIC STABILITY: FOOD INSECURITY: WITHIN THE PAST 12 MONTHS, YOU WORRIED THAT YOUR FOOD WOULD RUN OUT BEFORE YOU GOT MONEY TO BUY MORE.: NEVER TRUE

## 2023-07-19 NOTE — PROGRESS NOTES
Chief Complaint   Patient presents with    Obesity    Lower Back Pain     History obtained from the patient. SUBJECTIVE:  Marisa Medina is a 33 y.o. female that presents today for     -Obesity:  Started on adipex 3 months ago  On calorie restricted diet  Exercising regularly  Wts down 20+lbs  BMI down from 34 to 30.51  Working well  Would like to con't for now  Currently on her period. Wt Readings from Last 3 Encounters:   07/20/23 194 lb 12.8 oz (88.4 kg)   06/15/23 203 lb 9.6 oz (92.4 kg)   05/18/23 210 lb (95.3 kg)       -L hip/buttocks pain LAST VISIT:  Does c/o L buttocks pain with radiation down Leg  No back pain  Going on 12 months  This is the worst of her pain    UPDATE TODAY:   Chronic issue getting worse  We sent her to PT  Pain was worse with PT  So we setup with [facility]  They are getting MRI  Likely will see pain management  Sxs no better, no worse  She is asking if I would recommend she see pain management or not   No bowel/bladder issues.        Age/Gender Health Maintenance    Lipid -   Lab Results   Component Value Date    CHOL 228 (H) 07/23/2020    CHOL 209 (H) 11/02/2017     Lab Results   Component Value Date    TRIG 204 (H) 07/23/2020    TRIG 139 11/02/2017     Lab Results   Component Value Date    HDL 43 07/23/2020    HDL 46 11/02/2017     Lab Results   Component Value Date    LDLCALC 144 (H) 07/23/2020    LDLCALC 135 (H) 11/02/2017       DM Screen -   Lab Results   Component Value Date/Time    GLUCOSE 101 04/12/2023 01:21 PM       Colon Cancer Screening - age 38  Lung Cancer Screening - n/a    Tetanus - pt will check her records  Influenza Vaccine - Candidate FALL 2023  Pneumonia Vaccine - age 71  Zoster - age 47     Breast Cancer Screening - age 36-54  Cervical Cancer Screening - records pending, Dr. Lino Tejada  Osteoporosis Screening - age 71      Current Outpatient Medications   Medication Sig Dispense Refill    phentermine (ADIPEX-P) 37.5 MG tablet Take 1 tablet by mouth every morning

## 2023-07-20 ENCOUNTER — OFFICE VISIT (OUTPATIENT)
Dept: FAMILY MEDICINE CLINIC | Age: 28
End: 2023-07-20
Payer: COMMERCIAL

## 2023-07-20 VITALS
HEART RATE: 74 BPM | OXYGEN SATURATION: 98 % | HEIGHT: 67 IN | TEMPERATURE: 98 F | SYSTOLIC BLOOD PRESSURE: 128 MMHG | WEIGHT: 194.8 LBS | BODY MASS INDEX: 30.57 KG/M2 | RESPIRATION RATE: 16 BRPM | DIASTOLIC BLOOD PRESSURE: 78 MMHG

## 2023-07-20 DIAGNOSIS — E66.9 OBESITY (BMI 30-39.9): Primary | ICD-10-CM

## 2023-07-20 DIAGNOSIS — G89.29 CHRONIC LEFT-SIDED LOW BACK PAIN WITH LEFT-SIDED SCIATICA: ICD-10-CM

## 2023-07-20 DIAGNOSIS — M54.42 CHRONIC LEFT-SIDED LOW BACK PAIN WITH LEFT-SIDED SCIATICA: ICD-10-CM

## 2023-07-20 PROCEDURE — 99214 OFFICE O/P EST MOD 30 MIN: CPT | Performed by: FAMILY MEDICINE

## 2023-07-20 RX ORDER — PHENTERMINE HYDROCHLORIDE 37.5 MG/1
37.5 TABLET ORAL
Qty: 30 TABLET | Refills: 0 | Status: SHIPPED | OUTPATIENT
Start: 2023-07-20 | End: 2023-08-19

## 2023-07-20 SDOH — ECONOMIC STABILITY: INCOME INSECURITY: HOW HARD IS IT FOR YOU TO PAY FOR THE VERY BASICS LIKE FOOD, HOUSING, MEDICAL CARE, AND HEATING?: NOT HARD AT ALL

## 2023-07-20 SDOH — ECONOMIC STABILITY: FOOD INSECURITY: WITHIN THE PAST 12 MONTHS, YOU WORRIED THAT YOUR FOOD WOULD RUN OUT BEFORE YOU GOT MONEY TO BUY MORE.: NEVER TRUE

## 2023-07-20 SDOH — ECONOMIC STABILITY: FOOD INSECURITY: WITHIN THE PAST 12 MONTHS, THE FOOD YOU BOUGHT JUST DIDN'T LAST AND YOU DIDN'T HAVE MONEY TO GET MORE.: NEVER TRUE

## 2023-08-17 ENCOUNTER — NURSE ONLY (OUTPATIENT)
Dept: FAMILY MEDICINE CLINIC | Age: 28
End: 2023-08-17

## 2023-08-17 ENCOUNTER — TELEPHONE (OUTPATIENT)
Dept: FAMILY MEDICINE CLINIC | Age: 28
End: 2023-08-17

## 2023-08-17 VITALS — BODY MASS INDEX: 29.91 KG/M2 | WEIGHT: 191 LBS

## 2023-08-17 DIAGNOSIS — E66.9 OBESITY (BMI 30-39.9): Primary | ICD-10-CM

## 2023-08-17 RX ORDER — PHENTERMINE HYDROCHLORIDE 37.5 MG/1
37.5 TABLET ORAL
Qty: 30 TABLET | Refills: 0 | Status: SHIPPED | OUTPATIENT
Start: 2023-08-17 | End: 2023-09-16

## 2023-08-17 NOTE — TELEPHONE ENCOUNTER
Pt presents to the office for weight check. Pt's last weight on 6/15/23 was 203 lb  9.6 oz.  Pt's weight today is 191 lb.      [facility] [place]

## 2023-08-17 NOTE — TELEPHONE ENCOUNTER
ASSESSMENT & PLAN   Diagnosis Orders   1. Obesity (BMI 30-39.9)  phentermine (ADIPEX-P) 37.5 MG tablet          OAARS reviewed and appropriate. Controlled Substances Monitoring: Periodic Controlled Substance Monitoring: Possible medication side effects, risk of tolerance/dependence & alternative treatments discussed., No signs of potential drug abuse or diversion identified.  Jacqui Hogan DO)      Future Appointments   Date Time Provider [address] [place] [address]   9/18/2023  1:20 PM SCHEDULE, NURSE Collin [facility] Viraj [place]   10/23/2023  1:20 PM Jacqui Hogan DO [address] [place] [address]

## 2023-08-17 NOTE — PROGRESS NOTES
Pt presents to the office for weight check. Pt's last weight on 6/15/23 was 203 lb  9.6 oz.  Pt's weight today is 191 lb. Phone encounter was sent to Dr Francois Moncada  for review and appropriate refill.   Patient uses [facility] in [place]

## 2023-09-18 ENCOUNTER — NURSE ONLY (OUTPATIENT)
Dept: FAMILY MEDICINE CLINIC | Age: 28
End: 2023-09-18

## 2023-09-18 ENCOUNTER — TELEPHONE (OUTPATIENT)
Dept: FAMILY MEDICINE CLINIC | Age: 28
End: 2023-09-18

## 2023-09-18 VITALS — WEIGHT: 184 LBS | BODY MASS INDEX: 28.82 KG/M2

## 2023-09-18 DIAGNOSIS — E66.9 OBESITY (BMI 30-39.9): Primary | ICD-10-CM

## 2023-09-18 RX ORDER — PHENTERMINE HYDROCHLORIDE 37.5 MG/1
37.5 TABLET ORAL
Qty: 30 TABLET | Refills: 0 | Status: SHIPPED | OUTPATIENT
Start: 2023-09-18 | End: 2023-10-18

## 2023-09-18 NOTE — PROGRESS NOTES
Pt presents to the office for weight check. Pt's last weight on 8/17/23 was 191 lb. Pt's weight today is 184 lb. Phone encounter was sent to Dr Fartun Wright for review and appropriate refill.   Wt Readings from Last 3 Encounters:   09/18/23 184 lb (83.5 kg)   08/17/23 191 lb (86.6 kg)   07/20/23 194 lb 12.8 oz (88.4 kg)

## 2023-09-18 NOTE — TELEPHONE ENCOUNTER
Patient  presents in office for a weight check     Wt Readings from Last 3 Encounters:   08/17/23 191 lb (86.6 kg)   07/20/23 194 lb 12.8 oz (88.4 kg)   06/15/23 203 lb 9.6 oz (92.4 kg)     Pharmacy [address]

## 2023-09-18 NOTE — TELEPHONE ENCOUNTER
ASSESSMENT & PLAN   Diagnosis Orders   1. Obesity (BMI 30-39.9)  phentermine (ADIPEX-P) 37.5 MG tablet          OAARS reviewed and appropriate. Controlled Substances Monitoring: Periodic Controlled Substance Monitoring: Possible medication side effects, risk of tolerance/dependence & alternative treatments discussed., No signs of potential drug abuse or diversion identified.  Christian Macias DO)      Future Appointments   Date Time Provider [address] [place] [address]   10/16/2023  1:20 PM SCHEDULE, NURSE Collin [facility] [place]   10/23/2023  1:20 PM Christian Macias DO [address] [place] [address]

## 2023-10-16 ENCOUNTER — NURSE ONLY (OUTPATIENT)
Dept: FAMILY MEDICINE CLINIC | Age: 28
End: 2023-10-16

## 2023-10-16 ENCOUNTER — TELEPHONE (OUTPATIENT)
Dept: FAMILY MEDICINE CLINIC | Age: 28
End: 2023-10-16

## 2023-10-16 VITALS — WEIGHT: 182 LBS | BODY MASS INDEX: 28.51 KG/M2

## 2023-10-16 DIAGNOSIS — E66.9 OBESITY (BMI 30-39.9): Primary | ICD-10-CM

## 2023-10-16 NOTE — TELEPHONE ENCOUNTER
Pt presents to the office for weight check. Pt's last weight on 9/18/23 was 184 lbs. Pt's weight today is 182 lbs. Pt's last refill of the Adipex was on backorder till 9/29/23. Delfina Saldana had not taken Adipex for 2 weeks. No refill is needed at this time. Please advise.

## 2023-10-16 NOTE — TELEPHONE ENCOUNTER
Sounds good  Just f/u with me on 10/23  Thanks!     Future Appointments   Date Time Provider [address]   10/23/2023  1:20 PM Reyes Resendiz DO [facility] [address] [facility]

## 2023-10-16 NOTE — PROGRESS NOTES
Pt presents to the office for weight check. Pt's last weight on 9/18/23 was 184 lbs. Pt's weight today is 182 lbs. Phone encounter was sent to  for review and appropriate refill.

## 2023-10-23 ENCOUNTER — OFFICE VISIT (OUTPATIENT)
Dept: FAMILY MEDICINE CLINIC | Age: 28
End: 2023-10-23
Payer: COMMERCIAL

## 2023-10-23 VITALS
HEART RATE: 88 BPM | SYSTOLIC BLOOD PRESSURE: 126 MMHG | OXYGEN SATURATION: 99 % | HEIGHT: 67 IN | BODY MASS INDEX: 28.79 KG/M2 | WEIGHT: 183.4 LBS | DIASTOLIC BLOOD PRESSURE: 76 MMHG | TEMPERATURE: 97.7 F | RESPIRATION RATE: 18 BRPM

## 2023-10-23 DIAGNOSIS — E66.3 OVERWEIGHT (BMI 25.0-29.9): Primary | ICD-10-CM

## 2023-10-23 DIAGNOSIS — M54.42 CHRONIC LEFT-SIDED LOW BACK PAIN WITH LEFT-SIDED SCIATICA: ICD-10-CM

## 2023-10-23 DIAGNOSIS — G89.29 CHRONIC LEFT-SIDED LOW BACK PAIN WITH LEFT-SIDED SCIATICA: ICD-10-CM

## 2023-10-23 PROCEDURE — 99214 OFFICE O/P EST MOD 30 MIN: CPT | Performed by: FAMILY MEDICINE

## 2023-10-23 RX ORDER — PHENTERMINE HYDROCHLORIDE 37.5 MG/1
37.5 TABLET ORAL
Qty: 30 TABLET | Refills: 0 | Status: SHIPPED | OUTPATIENT
Start: 2023-10-23 | End: 2023-11-22

## 2023-10-30 DIAGNOSIS — E66.3 OVERWEIGHT (BMI 25.0-29.9): ICD-10-CM

## 2023-10-31 RX ORDER — PHENTERMINE HYDROCHLORIDE 37.5 MG/1
37.5 TABLET ORAL
Qty: 30 TABLET | Refills: 0 | Status: SHIPPED | OUTPATIENT
Start: 2023-10-31 | End: 2023-11-30

## 2023-10-31 NOTE — TELEPHONE ENCOUNTER
ASSESSMENT & PLAN   Diagnosis Orders   1. Overweight (BMI 25.0-29.9)  phentermine (ADIPEX-P) 37.5 MG tablet          OAARS reviewed and appropriate. Controlled Substances Monitoring: Periodic Controlled Substance Monitoring: Possible medication side effects, risk of tolerance/dependence & alternative treatments discussed., No signs of potential drug abuse or diversion identified.  Stephen Benito DO)      Future Appointments   Date Time Provider [address] [place] [address]   11/20/2023  1:20 PM SCHEDULE, [facility] [place]   1/23/2024  1:20 PM Stephen Benito DO [facility] Med [address]     Wt Readings from Last 3 Encounters:   10/23/23 83.2 kg (183 lb 6.4 oz)   10/16/23 82.6 kg (182 lb)   09/18/23 83.5 kg (184 lb)

## 2023-11-20 ENCOUNTER — TELEPHONE (OUTPATIENT)
Dept: FAMILY MEDICINE CLINIC | Age: 28
End: 2023-11-20

## 2023-11-20 ENCOUNTER — NURSE ONLY (OUTPATIENT)
Dept: FAMILY MEDICINE CLINIC | Age: 28
End: 2023-11-20

## 2023-11-20 VITALS — BODY MASS INDEX: 28.4 KG/M2 | WEIGHT: 181.4 LBS

## 2023-11-20 DIAGNOSIS — E66.3 OVERWEIGHT (BMI 25.0-29.9): Primary | ICD-10-CM

## 2023-11-20 RX ORDER — PHENTERMINE HYDROCHLORIDE 37.5 MG/1
37.5 TABLET ORAL
Qty: 30 TABLET | Refills: 0 | Status: SHIPPED | OUTPATIENT
Start: 2023-11-20 | End: 2023-12-20

## 2023-11-20 NOTE — TELEPHONE ENCOUNTER
ASSESSMENT & PLAN   Diagnosis Orders   1. Overweight (BMI 25.0-29.9)  phentermine (ADIPEX-P) 37.5 MG tablet          OAARS reviewed and appropriate. Controlled Substances Monitoring: Periodic Controlled Substance Monitoring: Possible medication side effects, risk of tolerance/dependence & alternative treatments discussed., No signs of potential drug abuse or diversion identified.  Wanda Payne DO)      Future Appointments   Date Time Provider [address] [place] [address]   12/18/2023  1:20 PM SCHEDULE, NURSE Collin Lockwood [facility] Viraj Frnech   1/23/2024  1:20 PM Wanda Payne DO [facility] [address] [place] [address]

## 2023-11-20 NOTE — PROGRESS NOTES
Pt presented to the office for a weight check  Weight today 181.4 lbs  Wt Readings from Last 3 Encounters:   10/23/23 83.2 kg (183 lb 6.4 oz)   10/16/23 82.6 kg (182 lb)   09/18/23 83.5 kg (184 lb)   Pt is on adipex

## 2023-11-20 NOTE — TELEPHONE ENCOUNTER
presented to the office for a weight check  Weight today 181.4 lbs      Wt Readings from Last 3 Encounters:   10/23/23 83.2 kg (183 lb 6.4 oz)   10/16/23 82.6 kg (182 lb)   09/18/23 83.5 kg (184 lb)   Pt is on adipex

## 2024-01-02 ENCOUNTER — HOSPITAL ENCOUNTER (OUTPATIENT)
Dept: GENERAL RADIOLOGY | Age: 29
Discharge: HOME | End: 2024-01-02
Payer: COMMERCIAL

## 2024-01-02 ENCOUNTER — HOSPITAL ENCOUNTER (OUTPATIENT)
Age: 29
Discharge: HOME | End: 2024-01-02
Payer: COMMERCIAL

## 2024-01-02 ENCOUNTER — TELEPHONE (OUTPATIENT)
Dept: FAMILY MEDICINE CLINIC | Age: 29
End: 2024-01-02

## 2024-01-02 ENCOUNTER — PATIENT MESSAGE (OUTPATIENT)
Dept: FAMILY MEDICINE CLINIC | Age: 29
End: 2024-01-02

## 2024-01-02 DIAGNOSIS — M54.42 CHRONIC LEFT-SIDED LOW BACK PAIN WITH LEFT-SIDED SCIATICA: Primary | ICD-10-CM

## 2024-01-02 DIAGNOSIS — G89.29 CHRONIC LEFT-SIDED LOW BACK PAIN WITH LEFT-SIDED SCIATICA: ICD-10-CM

## 2024-01-02 DIAGNOSIS — G89.29 CHRONIC LEFT-SIDED LOW BACK PAIN WITH LEFT-SIDED SCIATICA: Primary | ICD-10-CM

## 2024-01-02 DIAGNOSIS — M54.42 CHRONIC LEFT-SIDED LOW BACK PAIN WITH LEFT-SIDED SCIATICA: ICD-10-CM

## 2024-01-02 PROCEDURE — 72100 X-RAY EXAM L-S SPINE 2/3 VWS: CPT

## 2024-01-02 NOTE — TELEPHONE ENCOUNTER
From: Suad Gonzalez  To: Dr. Forest Hernandez  Sent: 1/2/2024 10:25 AM EST  Subject: MRI    Good morning, I was wondering if there’s anyway that I can get a referral for an MRI for my back. I have been seeing my chiropractor And they have advised me that I should have an MRI done because they think I may have a bulging or herniated disc in my lower back. But I didn’t know how that all works, if I have to come in for an appointment to have an MRI referral done?

## 2024-01-02 NOTE — TELEPHONE ENCOUNTER
----- Message from Forest Hernandez, DO sent at 1/2/2024  2:17 PM EST -----  Please let pt know that xray shows mild narrowing at L5-S1,  rest of xray looks good.   Con't with plan for MRI  Let me know if questions, thanks!

## 2024-01-02 NOTE — TELEPHONE ENCOUNTER
Diagnosis Orders   1. Chronic left-sided low back pain with left-sided sciatica  MRI LUMBAR SPINE WO CONTRAST    XR LUMBAR SPINE (2-3 VIEWS)

## 2024-01-02 NOTE — TELEPHONE ENCOUNTER
Staff  Can you get with pt and see what she needs?  Will still need to get insurance authorization.     Thanks!

## 2024-01-02 NOTE — TELEPHONE ENCOUNTER
Pt informed and understanding with no further questions at this time.     Pt would like to know if she can take the referral to outside of [facility] because the soonest they can get her in is Jan. 24th.

## 2024-01-02 NOTE — TELEPHONE ENCOUNTER
Called and spoke to patient, patient would like MRI scheduled with [facility] , I transferred patient over to central scheduling to get MRI scheduled!     Future Appointments   Date Time Provider Department Center   1/23/2024  1:20 PM [facility] [place]   1/24/2024 12:30 PM STR MRI RM1 STRZ MRI STR Rad/Card

## 2024-01-11 ENCOUNTER — TELEPHONE (OUTPATIENT)
Dept: FAMILY MEDICINE CLINIC | Age: 29
End: 2024-01-11

## 2024-01-11 NOTE — TELEPHONE ENCOUNTER
This is something that will not heal on its own  The pain may improve over time, but thus far it has not, so seems likely it still may not    Even if she see's the surgeon, it doesn't mean she has to have surgery, but I think it would be a good idea to get their opinion on it and have some options    Let me know her thoughts and what she'd like to do    Thanks!

## 2024-01-11 NOTE — TELEPHONE ENCOUNTER
----- Message from Forest Hernandez DO sent at 1/11/2024  9:47 AM EST -----  -Please let pt know that MRI shows disc protrusion at L5-S1, also describes a 4mm shift of her L5 vertebra as well. This combination could certainly be causing her pain.  -We could have her see a spine surgeon to discuss surgical options.  -We could also have her see pain management to discuss non-surgical options, such is nerve ablation to manage her pain also.   -Let me know how she would like to proceed.   -Let me know if questions, thanks!

## 2024-01-11 NOTE — TELEPHONE ENCOUNTER
Pt informed and understanding.     Pt would like to know if her spine is bad enough to need surgery or if it will heal over time

## 2024-01-22 PROBLEM — M51.369 DDD (DEGENERATIVE DISC DISEASE), LUMBAR: Status: ACTIVE | Noted: 2024-01-22

## 2024-01-22 PROBLEM — M51.36 DDD (DEGENERATIVE DISC DISEASE), LUMBAR: Status: ACTIVE | Noted: 2024-01-22

## 2024-01-22 NOTE — PROGRESS NOTES
distress,well developed and well- nourished  Head: normocephalic and atraumatic  Eyes: pupils equal, round, and reactive to light, extraocular eye movements intact, conjunctivae and eye lids without erythema  ENT: external ear and ear canal clear bilaterally, TMs intact and regular, nose without deformity, nasal mucosa and turbinates normal without polyps, oropharynx normal, dentition is normal for age  Neck: supple and non-tender without mass, no thyromegaly or thyroid nodules, no cervical lymphadenopathy  Pulmonary/Chest: clear to auscultation bilaterally- no wheezes, rales or rhonchi, normal air movement, no respiratory distress or retractions  Cardiovascular: S1 and S2 auscultated w/ RRR. No murmurs, rubs, clicks, or gallops, distal pulses intact.  Abdomen: soft, non-tender, non-distended, bowel sounds physiologic,  no rebound or guarding, no masses or hernias noted. Liver and spleen without enlargement.   Extremities: no cyanosis, clubbing or edema of the lower extremities. +2 PT/DP bilaterally.   Musculoskeletal: No joint swelling or gross deformity   Neuro:  Alert, 5/5 strength globally and symmetrically, 2+ patellar reflexes bilaterally  Psych: Affect appropriate.  Mood normal. Thought process is normal without evidence of depression or psychosis. Good insight and appropriate interaction.  Cognition and memory appear to be intact.  Skin: warm and dry, no rash or erythema  Lymph:  No cervical, auricular or supraclavicular lymph nodes palpated      ASSESSMENT & PLAN  1. Visit for preventive health examination    Vaccines reviewed and update rec's made  Routine labs ordered  Awaiting PAP results  Otherwise UTD on age appropriate screenings    - Glucose, Random; Future  - Lipid Panel; Future    2. Overweight (BMI 25.0-29.9)    Excellent improvement  Met goals  Off Adipex now  Con't TLc's    3. Chronic left-sided low back pain with left-sided sciatica    Pt deciding on pain management vs seeing spine surgeon  She

## 2024-01-23 ENCOUNTER — OFFICE VISIT (OUTPATIENT)
Dept: FAMILY MEDICINE CLINIC | Age: 29
End: 2024-01-23
Payer: COMMERCIAL

## 2024-01-23 VITALS
TEMPERATURE: 97.7 F | SYSTOLIC BLOOD PRESSURE: 128 MMHG | RESPIRATION RATE: 18 BRPM | DIASTOLIC BLOOD PRESSURE: 82 MMHG | HEIGHT: 67 IN | HEART RATE: 84 BPM | WEIGHT: 189 LBS | BODY MASS INDEX: 29.66 KG/M2 | OXYGEN SATURATION: 100 %

## 2024-01-23 DIAGNOSIS — G89.29 CHRONIC LEFT-SIDED LOW BACK PAIN WITH LEFT-SIDED SCIATICA: ICD-10-CM

## 2024-01-23 DIAGNOSIS — E66.3 OVERWEIGHT (BMI 25.0-29.9): ICD-10-CM

## 2024-01-23 DIAGNOSIS — Z00.00 VISIT FOR PREVENTIVE HEALTH EXAMINATION: Primary | ICD-10-CM

## 2024-01-23 DIAGNOSIS — M54.42 CHRONIC LEFT-SIDED LOW BACK PAIN WITH LEFT-SIDED SCIATICA: ICD-10-CM

## 2024-01-23 DIAGNOSIS — M51.36 DDD (DEGENERATIVE DISC DISEASE), LUMBAR: ICD-10-CM

## 2024-01-23 PROCEDURE — 99395 PREV VISIT EST AGE 18-39: CPT | Performed by: FAMILY MEDICINE

## 2024-01-23 ASSESSMENT — PATIENT HEALTH QUESTIONNAIRE - PHQ9
SUM OF ALL RESPONSES TO PHQ QUESTIONS 1-9: 0
SUM OF ALL RESPONSES TO PHQ QUESTIONS 1-9: 0
1. LITTLE INTEREST OR PLEASURE IN DOING THINGS: 0
2. FEELING DOWN, DEPRESSED OR HOPELESS: 0
SUM OF ALL RESPONSES TO PHQ QUESTIONS 1-9: 0
SUM OF ALL RESPONSES TO PHQ9 QUESTIONS 1 & 2: 0
SUM OF ALL RESPONSES TO PHQ QUESTIONS 1-9: 0

## 2024-01-23 NOTE — PATIENT INSTRUCTIONS
LAB INSTRUCTIONS:    Please complete labs within 4 week(s).    Please fast for 8 hours prior to lab collection.    The clinic will call you within 1 week of collection. If you have not heard from us within that amount of time, please call us at [phone].

## 2024-01-26 LAB
CHOLESTEROL/HDL RATIO: 3.6 RATIO
CHOLESTEROL: 183 MG/DL
GLUCOSE: 88 MG/DL (ref 70–99)
HDLC SERPL-MCNC: 51 MG/DL
LDL CHOLESTEROL CALCULATED: 113 MG/DL
LDL/HDL RATIO: 2.2 RATIO
TRIGL SERPL-MCNC: 93 MG/DL
VLDLC SERPL CALC-MCNC: 19 MG/DL

## 2024-01-29 ENCOUNTER — TELEPHONE (OUTPATIENT)
Dept: FAMILY MEDICINE CLINIC | Age: 29
End: 2024-01-29

## 2024-01-29 NOTE — TELEPHONE ENCOUNTER
----- Message from Forest Hernandez DO sent at 1/29/2024  6:39 AM EST -----  Please let pt know that labs look good  Let me know if questions, thanks!

## 2024-05-13 ENCOUNTER — TELEPHONE (OUTPATIENT)
Dept: FAMILY MEDICINE CLINIC | Age: 29
End: 2024-05-13

## 2024-05-13 ENCOUNTER — NURSE ONLY (OUTPATIENT)
Dept: FAMILY MEDICINE CLINIC | Age: 29
End: 2024-05-13
Payer: COMMERCIAL

## 2024-05-13 VITALS — WEIGHT: 213.4 LBS | BODY MASS INDEX: 33.42 KG/M2

## 2024-05-13 DIAGNOSIS — E66.3 OVERWEIGHT (BMI 25.0-29.9): Primary | ICD-10-CM

## 2024-05-13 LAB
CONTROL: PRESENT
PREGNANCY TEST URINE, POC: NORMAL

## 2024-05-13 PROCEDURE — 81025 URINE PREGNANCY TEST: CPT | Performed by: FAMILY MEDICINE

## 2024-05-13 RX ORDER — PHENTERMINE HYDROCHLORIDE 37.5 MG/1
37.5 TABLET ORAL
Qty: 30 TABLET | Refills: 0 | Status: SHIPPED | OUTPATIENT
Start: 2024-05-13 | End: 2024-06-12

## 2024-05-13 NOTE — TELEPHONE ENCOUNTER
Needs 4 wk nurse visit   Needs apt to see in in office in 3 months  Please schedule both of those.     ASSESSMENT & PLAN   Diagnosis Orders   1. Overweight (BMI 25.0-29.9)  phentermine (ADIPEX-P) 37.5 MG tablet          OAARS reviewed and appropriate.     Controlled Substances Monitoring: Periodic Controlled Substance Monitoring: Possible medication side effects, risk of tolerance/dependence & alternative treatments discussed., No signs of potential drug abuse or diversion identified. (Forest Hernandez DO)      Future Appointments   Date Time Provider Department Center   1/27/2025  1:20 PM Forest Hernandez DO [facility] [place]       Lab Results   Component Value Date    PREGTESTUR neg 05/13/2024    PREGSERUM NEGATIVE 11/22/2017

## 2024-05-13 NOTE — TELEPHONE ENCOUNTER
Pt presented to the office for a weight check for adipex  Weight today 213.4 lbs    Pregnancy test is neg  [facility]

## 2024-05-13 NOTE — TELEPHONE ENCOUNTER
Spoke with pt  Appts scheduled  Future Appointments   Date Time Provider Department Center   6/13/2024  1:20 PM SCHEDULE, NURSE [facility] [place]   8/15/2024  4:40 PM Forest Hernandez, DO [facility] [place]   1/27/2025  1:20 PM Forest Hernandez, DO [facility] [place]

## 2024-05-13 NOTE — PROGRESS NOTES
Pt presented to the office for a weight check for adipex  Weight today 213.4 lbs    Pregnancy test is neg  [facility]

## 2024-06-12 ENCOUNTER — NURSE ONLY (OUTPATIENT)
Dept: FAMILY MEDICINE CLINIC | Age: 29
End: 2024-06-12

## 2024-06-12 ENCOUNTER — TELEPHONE (OUTPATIENT)
Dept: FAMILY MEDICINE CLINIC | Age: 29
End: 2024-06-12

## 2024-06-12 VITALS — WEIGHT: 217 LBS | BODY MASS INDEX: 33.98 KG/M2

## 2024-06-12 DIAGNOSIS — E66.9 OBESITY (BMI 30-39.9): Primary | ICD-10-CM

## 2024-06-12 DIAGNOSIS — E66.3 OVERWEIGHT (BMI 25.0-29.9): Primary | ICD-10-CM

## 2024-06-12 RX ORDER — PHENTERMINE HYDROCHLORIDE 37.5 MG/1
37.5 TABLET ORAL
Qty: 30 TABLET | Refills: 0 | Status: SHIPPED | OUTPATIENT
Start: 2024-06-12 | End: 2024-07-12

## 2024-06-12 NOTE — TELEPHONE ENCOUNTER
K  Will see where we are in 4 wks    ASSESSMENT & PLAN   Diagnosis Orders   1. Obesity (BMI 30-39.9)  phentermine (ADIPEX-P) 37.5 MG tablet          OAARS reviewed and appropriate.     Controlled Substances Monitoring: Periodic Controlled Substance Monitoring: Possible medication side effects, risk of tolerance/dependence & alternative treatments discussed., No signs of potential drug abuse or diversion identified. (Forest Hernandez, )      Future Appointments   Date Time Provider Department Center   7/3/2024  3:40 PM SCHEDULE, NURSE [facility] [place]   8/15/2024  4:40 PM Forest Hernandez DO [facility] [place]   1/27/2025  1:20 PM Forest Hernandez DO [facility] [place]

## 2024-06-12 NOTE — TELEPHONE ENCOUNTER
Pt presented to the office for weight check.    Pt's weight is up 4 lbs, but pt stated she is getting ready to start her monthly cycle.    Wt Readings from Last 3 Encounters:   06/12/24 98.4 kg (217 lb)   05/13/24 96.8 kg (213 lb 6.4 oz)   01/23/24 85.7 kg (189 lb)       New script pending

## 2024-06-12 NOTE — PROGRESS NOTES
Pt presented to the office for weight check.    Pt's weight is up 4 lbs, but pt stated she is getting ready to start her monthly cycle.    Wt Readings from Last 3 Encounters:   06/12/24 98.4 kg (217 lb)   05/13/24 96.8 kg (213 lb 6.4 oz)   01/23/24 85.7 kg (189 lb)

## 2024-07-09 ENCOUNTER — TELEPHONE (OUTPATIENT)
Dept: FAMILY MEDICINE CLINIC | Age: 29
End: 2024-07-09

## 2024-07-09 ENCOUNTER — NURSE ONLY (OUTPATIENT)
Dept: FAMILY MEDICINE CLINIC | Age: 29
End: 2024-07-09

## 2024-07-09 VITALS — BODY MASS INDEX: 33.82 KG/M2 | WEIGHT: 216 LBS

## 2024-07-09 DIAGNOSIS — E66.9 OBESITY (BMI 30-39.9): Primary | ICD-10-CM

## 2024-07-09 RX ORDER — PHENTERMINE HYDROCHLORIDE 37.5 MG/1
37.5 TABLET ORAL
Qty: 30 TABLET | Refills: 0 | Status: SHIPPED | OUTPATIENT
Start: 2024-07-09 | End: 2024-08-08

## 2024-07-09 NOTE — TELEPHONE ENCOUNTER
Patient has been informed and voiced understanding, patient is wanting to try one more month and go from there

## 2024-07-09 NOTE — PROGRESS NOTES
Patient came in for a nurse visit today for a weight check, patient is on Adipex. Patient does not feel like adipex is doing much for her.  Patient denies having any issues with medication.     Patient uses [facility]     Wt Readings from Last 3 Encounters:   07/09/24 98 kg (216 lb)   06/12/24 98.4 kg (217 lb)   05/13/24 96.8 kg (213 lb 6.4 oz)

## 2024-07-09 NOTE — TELEPHONE ENCOUNTER
It doesn't look like she's lost much weight. I'm ok to stop it if she's ok with that. She can do 1 more month though if she'd like. Its up to her, let me know. She probably needs to make a change to diet/exercise to really make the difference.

## 2024-07-09 NOTE — TELEPHONE ENCOUNTER
Patient came in for a nurse visit today for a weight check, patient is on Adipex. Patient does not feel like adipex is doing much for her.  Patient denies having any issues with medication.      Patient uses [facility]     Wt Readings from Last 3 Encounters:   07/09/24 98 kg (216 lb)   06/12/24 98.4 kg (217 lb)   05/13/24 96.8 kg (213 lb 6.4 oz)

## 2024-07-09 NOTE — TELEPHONE ENCOUNTER
Rx sent to pharmacy.    Controlled Substance Monitoring:    Acute and Chronic Pain Monitoring:   RX Monitoring Periodic Controlled Substance Monitoring   7/9/2024   2:29 PM No signs of potential drug abuse or diversion identified.

## 2024-09-05 ENCOUNTER — HOSPITAL ENCOUNTER (EMERGENCY)
Age: 29
Discharge: HOME | End: 2024-09-05
Attending: EMERGENCY MEDICINE
Payer: COMMERCIAL

## 2024-09-05 ENCOUNTER — APPOINTMENT (OUTPATIENT)
Dept: GENERAL RADIOLOGY | Age: 29
End: 2024-09-05
Payer: COMMERCIAL

## 2024-09-05 VITALS
OXYGEN SATURATION: 100 % | RESPIRATION RATE: 16 BRPM | DIASTOLIC BLOOD PRESSURE: 72 MMHG | HEART RATE: 75 BPM | TEMPERATURE: 98.1 F | SYSTOLIC BLOOD PRESSURE: 129 MMHG

## 2024-09-05 DIAGNOSIS — M54.50 BILATERAL LOW BACK PAIN, UNSPECIFIED CHRONICITY, UNSPECIFIED WHETHER SCIATICA PRESENT: Primary | ICD-10-CM

## 2024-09-05 LAB
ANION GAP SERPL CALC-SCNC: 10 MEQ/L (ref 8–16)
B-HCG SERPL QL: NEGATIVE
BASOPHILS ABSOLUTE: 0.1 THOU/MM3 (ref 0–0.1)
BASOPHILS NFR BLD AUTO: 0.6 %
BUN SERPL-MCNC: 10 MG/DL (ref 7–22)
CALCIUM SERPL-MCNC: 9.3 MG/DL (ref 8.5–10.5)
CHLORIDE SERPL-SCNC: 102 MEQ/L (ref 98–111)
CO2 SERPL-SCNC: 25 MEQ/L (ref 23–33)
CREAT SERPL-MCNC: 0.7 MG/DL (ref 0.4–1.2)
DEPRECATED RDW RBC AUTO: 38.4 FL (ref 35–45)
EOSINOPHIL NFR BLD AUTO: 1.6 %
EOSINOPHILS ABSOLUTE: 0.2 THOU/MM3 (ref 0–0.4)
ERYTHROCYTE [DISTWIDTH] IN BLOOD BY AUTOMATED COUNT: 12.4 % (ref 11.5–14.5)
GFR SERPL CREATININE-BSD FRML MDRD: > 90 ML/MIN/1.73M2
GLUCOSE SERPL-MCNC: 83 MG/DL (ref 70–108)
HCT VFR BLD AUTO: 43 % (ref 37–47)
HGB BLD-MCNC: 14.5 GM/DL (ref 12–16)
IMM GRANULOCYTES # BLD AUTO: 0.02 THOU/MM3 (ref 0–0.07)
IMM GRANULOCYTES NFR BLD AUTO: 0.2 %
LYMPHOCYTES ABSOLUTE: 3 THOU/MM3 (ref 1–4.8)
LYMPHOCYTES NFR BLD AUTO: 29.4 %
MAGNESIUM SERPL-MCNC: 2.1 MG/DL (ref 1.6–2.4)
MCH RBC QN AUTO: 28.9 PG (ref 26–33)
MCHC RBC AUTO-ENTMCNC: 33.7 GM/DL (ref 32.2–35.5)
MCV RBC AUTO: 85.7 FL (ref 81–99)
MONOCYTES ABSOLUTE: 0.8 THOU/MM3 (ref 0.4–1.3)
MONOCYTES NFR BLD AUTO: 7.8 %
NEUTROPHILS ABSOLUTE: 6.2 THOU/MM3 (ref 1.8–7.7)
NEUTROPHILS NFR BLD AUTO: 60.4 %
NRBC BLD AUTO-RTO: 0 /100 WBC
OSMOLALITY SERPL CALC.SUM OF ELEC: 272 MOSMOL/KG (ref 275–300)
PLATELET # BLD AUTO: 311 THOU/MM3 (ref 130–400)
PMV BLD AUTO: 9.9 FL (ref 9.4–12.4)
POTASSIUM SERPL-SCNC: 4.3 MEQ/L (ref 3.5–5.2)
RBC # BLD AUTO: 5.02 MILL/MM3 (ref 4.2–5.4)
SODIUM SERPL-SCNC: 137 MEQ/L (ref 135–145)
WBC # BLD AUTO: 10.3 THOU/MM3 (ref 4.8–10.8)

## 2024-09-05 PROCEDURE — 99284 EMERGENCY DEPT VISIT MOD MDM: CPT

## 2024-09-05 PROCEDURE — 83735 ASSAY OF MAGNESIUM: CPT

## 2024-09-05 PROCEDURE — 85025 COMPLETE CBC W/AUTO DIFF WBC: CPT

## 2024-09-05 PROCEDURE — 36415 COLL VENOUS BLD VENIPUNCTURE: CPT

## 2024-09-05 PROCEDURE — 6360000002 HC RX W HCPCS

## 2024-09-05 PROCEDURE — 6370000000 HC RX 637 (ALT 250 FOR IP)

## 2024-09-05 PROCEDURE — 72100 X-RAY EXAM L-S SPINE 2/3 VWS: CPT

## 2024-09-05 PROCEDURE — 51798 US URINE CAPACITY MEASURE: CPT

## 2024-09-05 PROCEDURE — 80048 BASIC METABOLIC PNL TOTAL CA: CPT

## 2024-09-05 PROCEDURE — 96372 THER/PROPH/DIAG INJ SC/IM: CPT

## 2024-09-05 PROCEDURE — 84703 CHORIONIC GONADOTROPIN ASSAY: CPT

## 2024-09-05 RX ORDER — KETOROLAC TROMETHAMINE 30 MG/ML
30 INJECTION, SOLUTION INTRAMUSCULAR; INTRAVENOUS ONCE
Status: COMPLETED | OUTPATIENT
Start: 2024-09-05 | End: 2024-09-05

## 2024-09-05 RX ORDER — ORPHENADRINE CITRATE 100 MG/1
100 TABLET, EXTENDED RELEASE ORAL 2 TIMES DAILY
Qty: 20 TABLET | Refills: 0 | Status: SHIPPED | OUTPATIENT
Start: 2024-09-05 | End: 2024-09-15

## 2024-09-05 RX ORDER — LIDOCAINE 4 G/G
1 PATCH TOPICAL DAILY
Status: DISCONTINUED | OUTPATIENT
Start: 2024-09-05 | End: 2024-09-05 | Stop reason: HOSPADM

## 2024-09-05 RX ADMIN — KETOROLAC TROMETHAMINE 30 MG: 30 INJECTION, SOLUTION INTRAMUSCULAR at 15:16

## 2024-09-05 ASSESSMENT — ENCOUNTER SYMPTOMS
ABDOMINAL PAIN: 1
SHORTNESS OF BREATH: 0
CHOKING: 0
DIARRHEA: 0
ANAL BLEEDING: 0
CONSTIPATION: 1
BLOOD IN STOOL: 0
VOMITING: 0
EYES NEGATIVE: 1
CHEST TIGHTNESS: 0
NAUSEA: 1
BACK PAIN: 1
COUGH: 0

## 2024-09-05 ASSESSMENT — PAIN DESCRIPTION - ORIENTATION
ORIENTATION: RIGHT;LEFT
ORIENTATION: LOWER

## 2024-09-05 ASSESSMENT — PAIN - FUNCTIONAL ASSESSMENT: PAIN_FUNCTIONAL_ASSESSMENT: 0-10

## 2024-09-05 ASSESSMENT — PAIN SCALES - GENERAL
PAINLEVEL_OUTOF10: 8
PAINLEVEL_OUTOF10: 8

## 2024-09-05 ASSESSMENT — PAIN DESCRIPTION - LOCATION
LOCATION: BACK
LOCATION: BACK

## 2024-09-05 NOTE — ED TRIAGE NOTES
Presents to ED with c/o lower back pain. Patient states she has a bulging disc and when she was shaving her legs yesterday the pain started. Alert and oriented. Respirations easy and unlabored.

## 2024-09-05 NOTE — DISCHARGE INSTRUCTIONS
Please return to the ED if any concerns arise such as lower limb weakness, bowel incontinence, numbness around pelvis  Please follow-up with PCP for follow-up for further evaluation

## 2024-09-05 NOTE — ED NOTES
Pt reports not having much relief from pain medication. She reports some relief from resting back in bed. Will monitor. Dr. Urbina at bedside

## 2024-09-05 NOTE — ED PROVIDER NOTES
[facility] EMERGENCY DEPT  EMERGENCY DEPARTMENT ENCOUNTER          Pt Name: Suad Gonzalez  MRN: [id number]  Birthdate 1995  Date of evaluation: 9/5/2024  Physician: Emilie Urbina MD  Supervising Attending Physician: Mila Gaona MD       CHIEF COMPLAINT       Chief Complaint   Patient presents with    Back Pain         HISTORY OF PRESENT ILLNESS    HPI  Suad Gonzalez is a 29 y.o. female who presents to the emergency department from home, as a walk in to the ED lobby for evaluation of lower back pain.  Patient notes she was in the shower yesterday bent over shaving when she got back up she had excruciating send to her lower back pain that radiates down her legs mostly on the right.  It is associated with numbness of the first digit bilaterally, she is still able to move her lower limbs but pain is exacerbated when she dorsiflex her feet, worse on the right side.  She also says she had a loss of urinary continence yesterday while putting up a towel before her shower and has been worried about losing continence again so was drinking less water and going to the bathroom more often.  She has epigastric abdominal pain that started this morning with nausea but no vomiting.  She currently does not have any epigastric pain.  She also has a headache that she feels at the base of her skull that radiates towards the top of the her head she does not have any loss of consciousness but gets hot flashes that make her feel like she will pass out this also started today.  She has no changes in her vision, fever, changes in her stool, changes in her weight or appetite, blood in stool, chest pain, or cough.   The patient has no other acute complaints at this time.      REVIEW OF SYSTEMS   Review of Systems   Constitutional: Negative.  Negative for activity change, appetite change, fatigue and fever.   HENT: Negative.     Eyes: Negative.    Respiratory:  Negative for cough, choking, chest tightness and

## 2024-10-11 NOTE — PROGRESS NOTES
Chief Complaint   Patient presents with    Mole    Rash    Gastroesophageal Reflux    Follow-up     RM       History obtained from the patient. SUBJECTIVE:  Mars Redmond is a 20 y.o. female that presents today for     -01. Bump on forehead: mole on R side of forehead above R eyebrow. Getting bigger since [place]. Been there for several years. A little smaller today, but keeps getting bigger/smaller. No fam hx of skin CA. Wants to see derm    -02. Has rash on face, as well. Mostly below L eye and near nose. Red. Dry. No itch. Mild pain if puts lotion on it. No new soaps or detergents.     -03. GERD:    HPI: hx of GERD, worse with this pregnancy so far. No dysphagia. occ epigastric pain, not new. Not taking meds. No dysphagia. Is about 18 wks pregnant    Symptoms:  belching and eructation, heartburn, bilious reflux  Current therapy and response:  none  Recent change in symptoms? Yes; more frequent  Symptoms associated with certain foods? No  Alcohol use? No  Tobacco use? No    Abdominal Pain? No  Mid Back Pain? No  Melena? No      -04. Abd pain/elevated transaminases LAST VISIT: of note, pt about 8 wks pregnant, has upcoming OB apt. seen in ER 12/21 for RUQ abd pain. No longer has gall bladder. In ER had labs and RUQ US done. Labs showed elevated AST/ALT. US showed mild steatosis. Told to get GI consult. Instead we did f/u labs and f/u apt. Pt feeling better. Pain gone. Still gets occ upper abd pain. No ETOH use. No fam hx of liver dz. Here to f/u lab wokr    UPDATE TODAY: repeat LFT neg. No recurrent severe abd pain. Feeling well.       -05. About 17 wks pregant. Following with Dr. Enrique Daily. No FM just yet. No VB or cramping. No RUQ pain or jaundice. Had gest HTN last preg. No pre-e, HELLP or acute fatty liver of pregnancy.        Age/Gender Health Maintenance    Lipid -   Lab Results   Component Value Date    CHOL 209 (H) 11/02/2017     Lab Results   Component Value Date    TRIG 139 11/02/2017 Lab Results   Component Value Date    HDL 46 11/02/2017     Lab Results   Component Value Date    LDLCALC 135 (H) 11/02/2017     Lab Results   Component Value Date    LABVLDL 28 11/02/2017     Lab Results   Component Value Date    CHOLHDLRATIO 4.5 11/02/2017         DM Screen -   Lab Results   Component Value Date    GLUCOSE 107 12/21/2017    GLUCOSE 91 11/02/2017       Colon Cancer Screening - age 48  Lung Cancer Screening (Age 54 to [de-identified] with 30 pack year hx, current smoker or quit within past 15 years) - n/a    Tetanus - pt will check her records  Influenza Vaccine - declines NOV 2017  Pneumonia Vaccine - age 71  Zostavax - age 60     Breast Cancer Screening - age 44-55  Cervical Cancer Screening - records pending, Dr. Odell Garcia  Osteoporosis Screening - age 72    Falls screening - age 60      Current Outpatient Prescriptions   Medication Sig Dispense Refill    ranitidine (ZANTAC) 150 MG tablet Take 1 tablet by mouth 2 times daily 60 tablet 5    folic acid (FOLVITE) 1 MG tablet Take 1 mg by mouth daily      Pediatric Multiple Vit-C-FA (FLINSTONES GUMMIES OMEGA-3 DHA PO) Take by mouth       No current facility-administered medications for this visit. Orders Placed This Encounter   Medications    ranitidine (ZANTAC) 150 MG tablet     Sig: Take 1 tablet by mouth 2 times daily     Dispense:  60 tablet     Refill:  5         All medications reviewed and reconciled, including OTC and herbal medications. Updated list given to patient. Patient Active Problem List    Diagnosis Date Noted    RM (nonalcoholic steatohepatitis) 12/22/2017    Abnormal cardiovascular stress test 11/20/2017    Other chest pain 11/02/2017    POLLARD (dyspnea on exertion) 11/02/2017    Palpitations 11/02/2017    Heat intolerance 11/02/2017    Anxiety 11/02/2017    Thyroid nodule 11/02/2017    Morbid obesity with BMI of 40.0-44.9, adult ([facility] [address]     Gestational hypertension      resolved after pregnancy.  no Pre-ecclampsia      stool  Genitourinary:  Difficulty or painful urination, Flank pain, Change in frequency, Urgency  Skin:  Color change, Rash, Itching, Wound  Musculoskeletal:  Joint pain, Back pain, Gait problems, Joint swelling, Myalgias  Neurological:  Dizziness, Headaches, Presyncope, Numbness, Seizures, Tremors  Endocrine:  Heat Intolerance, Cold Intolerance, Polydipsia, Polyphagia, Polyuria      PHYSICAL EXAM:  Vitals:    03/22/18 1358   BP: 124/62   Pulse: 89   Resp: 16   Temp: 98.9 °F (37.2 °C)   Weight: 250 lb (113.4 kg)   Height: 5' 5\" (1.651 m)     Body mass index is 41.6 kg/m². Pain Score:   0 - No pain    VS Reviewed  General Appearance: A&O x 3, No acute distress,well developed and well- nourished  Eyes: pupils equal, round, and reactive to light, extraocular eye movements intact, conjunctivae and eye lids without erythema  ENT: external ear and ear canal clear bilaterally, TMs intact and regular, nose without deformity, nasal mucosa and turbinates normal without polyps, oropharynx normal, dentition is normal for age  Neck: supple and non-tender without mass, no thyromegaly or thyroid nodules, no cervical lymphadenopathy  Pulmonary/Chest: clear to auscultation bilaterally- no wheezes, rales or rhonchi, normal air movement, no respiratory distress or retractions  Cardiovascular: S1 and S2 auscultated w/ RRR. No murmurs, rubs, clicks, or gallops, distal pulses intact. Abdomen: soft, non-tender, non-distended, bowl sounds physiologic,  no rebound or guarding, no masses or hernias noted. Liver and spleen without enlargement. Extremities: no cyanosis, clubbing or edema of the lower extremities. Skin: warm and dry, no rash or erythema. 0.4 cm x 0.4 cm pigmented papule above R eyebrow. Small red squamopapular rash below L eye.        Lab Results   Component Value Date    ALKPHOS 70 01/30/2018    ALKPHOS 81 12/21/2017    ALT 22 01/30/2018    AST 18 01/30/2018    PROT 6.7 01/30/2018    BILITOT 0.4 01/30/2018    BILIDIR 0.1 No

## 2025-01-31 ENCOUNTER — TRANSCRIBE ORDERS (OUTPATIENT)
Dept: ADMINISTRATIVE | Age: 30
End: 2025-01-31

## 2025-01-31 DIAGNOSIS — M25.562 LEFT KNEE PAIN, UNSPECIFIED CHRONICITY: Primary | ICD-10-CM

## 2025-01-31 DIAGNOSIS — M79.605 PAIN IN LEFT LEG: ICD-10-CM

## 2025-01-31 DIAGNOSIS — M79.89 OTHER SPECIFIED SOFT TISSUE DISORDERS: ICD-10-CM

## 2025-02-04 ENCOUNTER — TRANSCRIBE ORDERS (OUTPATIENT)
Dept: ADMINISTRATIVE | Age: 30
End: 2025-02-04

## 2025-02-04 DIAGNOSIS — M79.605 LEFT LEG PAIN: ICD-10-CM

## 2025-02-04 DIAGNOSIS — M25.562 LEFT KNEE PAIN, UNSPECIFIED CHRONICITY: Primary | ICD-10-CM

## 2025-02-04 DIAGNOSIS — M79.89 OTHER SPECIFIED SOFT TISSUE DISORDERS: ICD-10-CM

## 2025-06-27 ENCOUNTER — PATIENT MESSAGE (OUTPATIENT)
Dept: FAMILY MEDICINE CLINIC | Age: 30
End: 2025-06-27

## 2025-06-30 SDOH — ECONOMIC STABILITY: INCOME INSECURITY: IN THE LAST 12 MONTHS, WAS THERE A TIME WHEN YOU WERE NOT ABLE TO PAY THE MORTGAGE OR RENT ON TIME?: NO

## 2025-06-30 SDOH — ECONOMIC STABILITY: FOOD INSECURITY: WITHIN THE PAST 12 MONTHS, THE FOOD YOU BOUGHT JUST DIDN'T LAST AND YOU DIDN'T HAVE MONEY TO GET MORE.: NEVER TRUE

## 2025-06-30 SDOH — ECONOMIC STABILITY: FOOD INSECURITY: WITHIN THE PAST 12 MONTHS, YOU WORRIED THAT YOUR FOOD WOULD RUN OUT BEFORE YOU GOT MONEY TO BUY MORE.: NEVER TRUE

## 2025-06-30 SDOH — ECONOMIC STABILITY: TRANSPORTATION INSECURITY
IN THE PAST 12 MONTHS, HAS THE LACK OF TRANSPORTATION KEPT YOU FROM MEDICAL APPOINTMENTS OR FROM GETTING MEDICATIONS?: NO

## 2025-06-30 ASSESSMENT — PATIENT HEALTH QUESTIONNAIRE - PHQ9
2. FEELING DOWN, DEPRESSED OR HOPELESS: NOT AT ALL
1. LITTLE INTEREST OR PLEASURE IN DOING THINGS: SEVERAL DAYS
SUM OF ALL RESPONSES TO PHQ QUESTIONS 1-9: 1
2. FEELING DOWN, DEPRESSED OR HOPELESS: NOT AT ALL
SUM OF ALL RESPONSES TO PHQ QUESTIONS 1-9: 1
SUM OF ALL RESPONSES TO PHQ QUESTIONS 1-9: 1
SUM OF ALL RESPONSES TO PHQ9 QUESTIONS 1 & 2: 1
SUM OF ALL RESPONSES TO PHQ QUESTIONS 1-9: 1
1. LITTLE INTEREST OR PLEASURE IN DOING THINGS: SEVERAL DAYS

## 2025-06-30 NOTE — PROGRESS NOTES
air movement, no respiratory distress or retractions  Cardiovascular: S1 and S2 auscultated w/ RRR. No murmurs, rubs, clicks, or gallops, distal pulses intact.  Abdomen: soft, non-tender, non-distended, bowel sounds physiologic,  no rebound or guarding, no masses or hernias noted. Liver and spleen without enlargement.   Extremities: no cyanosis, clubbing or edema of the lower extremities.   Skin: warm and dry, no rash or erythema      ASSESSMENT & PLAN  1. Obesity (BMI 30-39.9)    Con't calorie restricted diet  Inc exercise as able  Routine labs ordered  Resume Adipex    F/u 4 wk nurse visit for wt check  3 months in office    OAARS reviewed and appropriate.     Controlled Substances Monitoring:     Periodic Controlled Substance Monitoring: Possible medication side effects, risk of tolerance/dependence & alternative treatments discussed., No signs of potential drug abuse or diversion identified. (Forest Hernandez DO)    - CBC with Auto Differential; Future  - Comprehensive Metabolic Panel; Future  - Hemoglobin A1C; Future  - Lipid Panel; Future  - TSH reflex to FT4; Future  - phentermine (ADIPEX-P) 37.5 MG tablet; Take 1 tablet by mouth every morning (before breakfast) for 30 days. Max Daily Amount: 37.5 mg  Dispense: 30 tablet; Refill: 0    2. Dyslipidemia    Con't diet changes  Labs ordered    - CBC with Auto Differential; Future  - Comprehensive Metabolic Panel; Future  - Hemoglobin A1C; Future  - Lipid Panel; Future  - TSH reflex to FT4; Future    3. Degeneration of intervertebral disc of lumbar region, unspecified whether pain present    F/u chiro  Con't HEP  F/u OIO PRN      DISPOSITION    Return in about 3 months (around 10/1/2025) for weight loss/Adipex, sooner as needed.    Suad released without restrictions.    Future Appointments   Date Time Provider Department Center   7/29/2025  3:00 PM SCHEDULE, NURSE [facility] [place] [facility] DEP   10/1/2025  3:00 PM Forest Hernandez DO [facility]

## 2025-06-30 NOTE — PATIENT INSTRUCTIONS
LAB INSTRUCTIONS:    Please complete labs within 2 week(s).    Please fast for 8 hours prior to lab collection.    The clinic will call you within 1 week of collection. If you have not heard from us within that amount of time, please call us at [phone].

## 2025-07-01 ENCOUNTER — OFFICE VISIT (OUTPATIENT)
Dept: FAMILY MEDICINE CLINIC | Age: 30
End: 2025-07-01

## 2025-07-01 VITALS
DIASTOLIC BLOOD PRESSURE: 84 MMHG | RESPIRATION RATE: 16 BRPM | HEIGHT: 67 IN | HEART RATE: 77 BPM | BODY MASS INDEX: 37.79 KG/M2 | OXYGEN SATURATION: 99 % | WEIGHT: 240.8 LBS | SYSTOLIC BLOOD PRESSURE: 128 MMHG | TEMPERATURE: 97.8 F

## 2025-07-01 DIAGNOSIS — E78.5 DYSLIPIDEMIA: ICD-10-CM

## 2025-07-01 DIAGNOSIS — M51.369 DEGENERATION OF INTERVERTEBRAL DISC OF LUMBAR REGION, UNSPECIFIED WHETHER PAIN PRESENT: ICD-10-CM

## 2025-07-01 DIAGNOSIS — E66.9 OBESITY (BMI 30-39.9): Primary | Chronic | ICD-10-CM

## 2025-07-01 RX ORDER — PHENTERMINE HYDROCHLORIDE 37.5 MG/1
37.5 TABLET ORAL
Qty: 30 TABLET | Refills: 0 | Status: SHIPPED | OUTPATIENT
Start: 2025-07-01 | End: 2025-07-31

## 2025-07-19 LAB
A/G RATIO: 1.5 (ref 1.5–2.5)
ALBUMIN: 4.4 G/DL (ref 3.5–5)
ALP BLD-CCNC: 56 IU/L (ref 39–118)
ALT SERPL-CCNC: 14 IU/L (ref 10–40)
ANION GAP SERPL CALCULATED.3IONS-SCNC: 7 MMOL/L (ref 4–12)
AST SERPL-CCNC: 17 IU/L (ref 15–41)
BASOPHILS ABSOLUTE: 100 /CMM (ref 0–200)
BASOPHILS RELATIVE PERCENT: 0.7 % (ref 0–2)
BILIRUB SERPL-MCNC: 0.7 MG/DL (ref 0.2–1)
BUN BLDV-MCNC: 8 MG/DL (ref 7–20)
CALCIUM SERPL-MCNC: 9.3 MG/DL (ref 8.8–10.5)
CHLORIDE BLD-SCNC: 106 MEQ/L (ref 101–111)
CHOLESTEROL, TOTAL: 158 MG/DL
CHOLESTEROL/HDL RELATIVE RISK: 3.5 (ref 4–4.4)
CO2: 23 MEQ/L (ref 21–32)
CREAT SERPL-MCNC: 0.9 MG/DL (ref 0.6–1.3)
CREATININE CLEARANCE: >60
DIRECT-LDL / HDL RISK: 2.6
EOSINOPHILS ABSOLUTE: 100 /CMM (ref 0–500)
EOSINOPHILS RELATIVE PERCENT: 1.5 % (ref 0–6)
ESTIMATED AVERAGE GLUCOSE: 94 MG/DL
GLUCOSE: 95 MG/DL (ref 70–110)
HBA1C MFR BLD: 4.9 % (ref 4.4–6.4)
HCT VFR BLD CALC: 39.7 % (ref 35–44)
HDLC SERPL-MCNC: 45 MG/DL
HEMOGLOBIN: 13.6 GM/DL (ref 12–15)
LDL CHOLESTEROL DIRECT: 121 MG/DL
LYMPHOCYTES ABSOLUTE: 2200 /CMM (ref 1000–4800)
LYMPHOCYTES RELATIVE PERCENT: 27.3 % (ref 15–45)
MCH RBC QN AUTO: 28.7 PG (ref 27.5–33)
MCHC RBC AUTO-ENTMCNC: 34.3 GM/DL (ref 33–36)
MCV RBC AUTO: 83.7 CU MIC (ref 80–97)
MONOCYTES ABSOLUTE: 500 /CMM (ref 0–800)
MONOCYTES RELATIVE PERCENT: 6.4 % (ref 2–10)
NEUTROPHILS ABSOLUTE: 5200 /CMM (ref 1800–7700)
NEUTROPHILS RELATIVE PERCENT: 64.1 % (ref 40–70)
NUCLEATED RBCS: 0.1 /100 WBC
PDW BLD-RTO: 13.4 % (ref 12–16)
PLATELET # BLD: 238 TH/CMM (ref 150–400)
POTASSIUM SERPL-SCNC: 4 MEQ/L (ref 3.6–5)
RBC # BLD: 4.74 MIL/CMM (ref 4–5.1)
SODIUM BLD-SCNC: 136 MEQ/L (ref 135–145)
TOTAL PROTEIN: 7.4 G/DL (ref 6.2–8)
TRIGL SERPL-MCNC: 59 MG/DL
TSH REFLEX: 1.43 MCIU/ML (ref 0.49–4.67)
VLDLC SERPL CALC-MCNC: 11 MG/DL
WBC # BLD: 8.2 TH/CMM (ref 4.4–10.5)

## 2025-07-20 ENCOUNTER — RESULTS FOLLOW-UP (OUTPATIENT)
Dept: FAMILY MEDICINE CLINIC | Age: 30
End: 2025-07-20

## 2025-07-21 NOTE — TELEPHONE ENCOUNTER
Left detailed message regarding lab results. Okay per HIPAA. Requested call back at [phone]  if they have any further questions.

## 2025-07-29 ENCOUNTER — TELEPHONE (OUTPATIENT)
Dept: FAMILY MEDICINE CLINIC | Age: 30
End: 2025-07-29

## 2025-07-29 ENCOUNTER — CLINICAL SUPPORT (OUTPATIENT)
Dept: FAMILY MEDICINE CLINIC | Age: 30
End: 2025-07-29

## 2025-07-29 VITALS — WEIGHT: 229.4 LBS | BODY MASS INDEX: 35.92 KG/M2

## 2025-07-29 DIAGNOSIS — E66.9 OBESITY (BMI 30-39.9): Primary | ICD-10-CM

## 2025-07-29 DIAGNOSIS — E66.9 OBESITY (BMI 30-39.9): Primary | Chronic | ICD-10-CM

## 2025-07-29 RX ORDER — PHENTERMINE HYDROCHLORIDE 37.5 MG/1
37.5 TABLET ORAL
Qty: 30 TABLET | Refills: 0 | Status: SHIPPED | OUTPATIENT
Start: 2025-07-29 | End: 2025-08-28

## 2025-07-29 NOTE — PROGRESS NOTES
Patient came in for weight check    Uses [facility] in [place]      Wt Readings from Last 3 Encounters:   07/29/25 104.1 kg (229 lb 6.4 oz)   07/01/25 109.2 kg (240 lb 12.8 oz)   07/09/24 98 kg (216 lb)

## 2025-07-29 NOTE — TELEPHONE ENCOUNTER
Patient came in for weight check    [facility]      Wt Readings from Last 3 Encounters:   07/29/25 104.1 kg (229 lb 6.4 oz)   07/01/25 109.2 kg (240 lb 12.8 oz)   07/09/24 98 kg (216 lb)      I was swamped I didn't schedule her a follow up    Will call later and do

## 2025-07-29 NOTE — TELEPHONE ENCOUNTER
Thanks.  If have time, can call her tomorrow to schedule.     ASSESSMENT & PLAN   Diagnosis Orders   1. Obesity (BMI 30-39.9)  phentermine (ADIPEX-P) 37.5 MG tablet          OAARS reviewed and appropriate.     Controlled Substances Monitoring: Periodic Controlled Substance Monitoring: Possible medication side effects, risk of tolerance/dependence & alternative treatments discussed., No signs of potential drug abuse or diversion identified. (Forest Hernandez DO)      Future Appointments   Date Time Provider Department Center   10/1/2025  3:00 PM Forest Hernandez DO [facility] DEP

## 2025-07-30 NOTE — TELEPHONE ENCOUNTER
Future Appointments   Date Time Provider Department Center   8/26/2025  1:00 PM SCHEDULE, NURSE [facility] DEP   10/6/2025 12:40 PM Forest Hernandez,  [facility] DEP

## 2025-08-26 ENCOUNTER — CLINICAL SUPPORT (OUTPATIENT)
Dept: FAMILY MEDICINE CLINIC | Age: 30
End: 2025-08-26

## 2025-08-26 VITALS — WEIGHT: 227.4 LBS | BODY MASS INDEX: 35.61 KG/M2

## 2025-08-26 DIAGNOSIS — E66.9 OBESITY (BMI 30-39.9): Primary | Chronic | ICD-10-CM

## 2025-08-26 DIAGNOSIS — E66.9 OBESITY (BMI 30-39.9): Primary | ICD-10-CM

## 2025-08-26 RX ORDER — PHENTERMINE HYDROCHLORIDE 37.5 MG/1
37.5 TABLET ORAL
Qty: 30 TABLET | Refills: 0 | Status: SHIPPED | OUTPATIENT
Start: 2025-08-26 | End: 2025-09-25

## (undated) DEVICE — ROYAL SILK SURGICAL GOWN, XXL: Brand: CONVERTORS

## (undated) DEVICE — SOLUTION IV IRRIG POUR BRL 0.9% SODIUM CHL 2F7124

## (undated) DEVICE — SET LNR RED GRN W/ BASE CLEANASCOPE

## (undated) DEVICE — TUBING IV STOPCOCK 48 CM 3 W

## (undated) DEVICE — TROCAR: Brand: KII FIOS FIRST ENTRY

## (undated) DEVICE — SOLUTION IV 1000ML 0.9% SOD CHL PH 5 INJ USP VIAFLX PLAS

## (undated) DEVICE — EXCEL 10FT (3.05 M) INSUFFLATION TUBING SET WITH 0.1 MICRON FILTER: Brand: EXCEL

## (undated) DEVICE — 3M™ WARMING BLANKET, UPPER BODY, 10 PER CASE, 42268: Brand: BAIR HUGGER™

## (undated) DEVICE — SUTURE VCRL SZ 4-0 L27IN ABSRB UD L19MM FS-2 3/8 CIR REV J422H

## (undated) DEVICE — ENDO KIT: Brand: MEDLINE INDUSTRIES, INC.

## (undated) DEVICE — TROCAR: Brand: KII SHIELDED BLADED ACCESS SYSTEM

## (undated) DEVICE — APPLIER CLP M L L11.4IN DIA10MM ENDOSCP ROT MULT FOR LIG

## (undated) DEVICE — SPONGE GZ W4XL4IN COT 12 PLY TYP VII WVN C FLD DSGN

## (undated) DEVICE — DRAPE C ARM W36XL30IN RECTANG BND BG AND TAPE

## (undated) DEVICE — IV START KIT: Brand: MEDLINE INDUSTRIES, INC.

## (undated) DEVICE — INSUFFLATION NEEDLE TO ESTABLISH PNEUMOPERITONEUM.: Brand: INSUFFLATION NEEDLE

## (undated) DEVICE — SET ADMIN 25ML L117IN PMP MOD CK VLV RLER CLMP 2 SMRTSITE

## (undated) DEVICE — Device: Brand: BALLOON3

## (undated) DEVICE — CONMED SCOPE SAVER BITE BLOCK, 20X27 MM: Brand: SCOPE SAVER

## (undated) DEVICE — COVER ARMBRD W13XL28.5IN IMPERV BLU FOR OP RM

## (undated) DEVICE — GLOVE ORANGE PI 8   MSG9080

## (undated) DEVICE — CATHETER ETER IV 22GA L1IN POLYUR STR RADPQ INTROCAN SFTY

## (undated) DEVICE — UNIVERSAL MONOPOLAR LAPAROSCOPIC CABLE 10FT, 4MM PIN CONNECTOR: Brand: CONMED

## (undated) DEVICE — TROCAR: Brand: KII® SLEEVE

## (undated) DEVICE — FLEXIBLE ADHESIVE BANDAGE: Brand: CURITY

## (undated) DEVICE — CORE TRUMPET FOR SINGLE SOLUTION BAG: Brand: CORE DYNAMICS

## (undated) DEVICE — GENERAL LAPAROSCOPY PACK-LF: Brand: MEDLINE INDUSTRIES, INC.

## (undated) DEVICE — GLOVE ORANGE PI 8 1/2   MSG9085

## (undated) DEVICE — Device: Brand: DEFENDO VALVE AND CONNECTOR KIT

## (undated) DEVICE — CATHETER CHOLGM 4.5FR L18IN TIP 5.5FR W/ MTL SUPP TB TAUT

## (undated) DEVICE — GOWN,SIRUS,NONRNF,SETINSLV,XL,20/CS: Brand: MEDLINE

## (undated) DEVICE — SOLUTION IV 1000ML 0.45% SOD CHL PH 5 INJ USP VIAFLX PLAS

## (undated) DEVICE — INTRODUCER CATH L3.5IN DIA7.5FR FOR CHOLANGIOGRAPHY TAUT